# Patient Record
Sex: MALE | Race: WHITE | NOT HISPANIC OR LATINO | Employment: FULL TIME | URBAN - NONMETROPOLITAN AREA
[De-identification: names, ages, dates, MRNs, and addresses within clinical notes are randomized per-mention and may not be internally consistent; named-entity substitution may affect disease eponyms.]

---

## 2023-10-26 ENCOUNTER — APPOINTMENT (OUTPATIENT)
Dept: RADIOLOGY | Facility: HOSPITAL | Age: 58
End: 2023-10-26
Payer: COMMERCIAL

## 2023-10-26 ENCOUNTER — HOSPITAL ENCOUNTER (EMERGENCY)
Facility: HOSPITAL | Age: 58
Discharge: HOME | End: 2023-10-26
Attending: EMERGENCY MEDICINE
Payer: COMMERCIAL

## 2023-10-26 VITALS
SYSTOLIC BLOOD PRESSURE: 152 MMHG | OXYGEN SATURATION: 95 % | RESPIRATION RATE: 16 BRPM | TEMPERATURE: 97.8 F | HEIGHT: 68 IN | BODY MASS INDEX: 21.22 KG/M2 | HEART RATE: 68 BPM | WEIGHT: 140 LBS | DIASTOLIC BLOOD PRESSURE: 82 MMHG

## 2023-10-26 DIAGNOSIS — N28.89 RIGHT KIDNEY MASS: ICD-10-CM

## 2023-10-26 DIAGNOSIS — R10.11 RIGHT UPPER QUADRANT ABDOMINAL PAIN: Primary | ICD-10-CM

## 2023-10-26 DIAGNOSIS — S37.011A HEMATOMA OF RIGHT KIDNEY, INITIAL ENCOUNTER: ICD-10-CM

## 2023-10-26 LAB
ALBUMIN SERPL BCP-MCNC: 3.8 G/DL (ref 3.4–5)
ALP SERPL-CCNC: 59 U/L (ref 33–120)
ALT SERPL W P-5'-P-CCNC: 12 U/L (ref 10–52)
ANION GAP SERPL CALC-SCNC: 9 MMOL/L (ref 10–20)
APPEARANCE UR: CLEAR
AST SERPL W P-5'-P-CCNC: 12 U/L (ref 9–39)
BASOPHILS # BLD AUTO: 0.02 X10*3/UL (ref 0–0.1)
BASOPHILS NFR BLD AUTO: 0.2 %
BILIRUB SERPL-MCNC: 0.4 MG/DL (ref 0–1.2)
BILIRUB UR STRIP.AUTO-MCNC: NEGATIVE MG/DL
BUN SERPL-MCNC: 18 MG/DL (ref 6–23)
CALCIUM SERPL-MCNC: 8.7 MG/DL (ref 8.6–10.3)
CHLORIDE SERPL-SCNC: 106 MMOL/L (ref 98–107)
CO2 SERPL-SCNC: 29 MMOL/L (ref 21–32)
COLOR UR: YELLOW
CREAT SERPL-MCNC: 0.84 MG/DL (ref 0.5–1.3)
EOSINOPHIL # BLD AUTO: 0 X10*3/UL (ref 0–0.7)
EOSINOPHIL NFR BLD AUTO: 0 %
ERYTHROCYTE [DISTWIDTH] IN BLOOD BY AUTOMATED COUNT: 12.5 % (ref 11.5–14.5)
GFR SERPL CREATININE-BSD FRML MDRD: >90 ML/MIN/1.73M*2
GLUCOSE SERPL-MCNC: 126 MG/DL (ref 74–99)
GLUCOSE UR STRIP.AUTO-MCNC: NEGATIVE MG/DL
HCT VFR BLD AUTO: 36.1 % (ref 41–52)
HGB BLD-MCNC: 12 G/DL (ref 13.5–17.5)
HOLD SPECIMEN: NORMAL
IMM GRANULOCYTES # BLD AUTO: 0.04 X10*3/UL (ref 0–0.7)
IMM GRANULOCYTES NFR BLD AUTO: 0.3 % (ref 0–0.9)
KETONES UR STRIP.AUTO-MCNC: ABNORMAL MG/DL
LEUKOCYTE ESTERASE UR QL STRIP.AUTO: NEGATIVE
LIPASE SERPL-CCNC: 15 U/L (ref 9–82)
LYMPHOCYTES # BLD AUTO: 0.75 X10*3/UL (ref 1.2–4.8)
LYMPHOCYTES NFR BLD AUTO: 6.5 %
MCH RBC QN AUTO: 32.4 PG (ref 26–34)
MCHC RBC AUTO-ENTMCNC: 33.2 G/DL (ref 32–36)
MCV RBC AUTO: 98 FL (ref 80–100)
MONOCYTES # BLD AUTO: 0.63 X10*3/UL (ref 0.1–1)
MONOCYTES NFR BLD AUTO: 5.5 %
MUCOUS THREADS #/AREA URNS AUTO: ABNORMAL /LPF
NEUTROPHILS # BLD AUTO: 10.09 X10*3/UL (ref 1.2–7.7)
NEUTROPHILS NFR BLD AUTO: 87.5 %
NITRITE UR QL STRIP.AUTO: NEGATIVE
NRBC BLD-RTO: 0 /100 WBCS (ref 0–0)
PH UR STRIP.AUTO: 6 [PH]
PLATELET # BLD AUTO: 283 X10*3/UL (ref 150–450)
PMV BLD AUTO: 8.9 FL (ref 7.5–11.5)
POTASSIUM SERPL-SCNC: 3.9 MMOL/L (ref 3.5–5.3)
PROT SERPL-MCNC: 6.2 G/DL (ref 6.4–8.2)
PROT UR STRIP.AUTO-MCNC: ABNORMAL MG/DL
RBC # BLD AUTO: 3.7 X10*6/UL (ref 4.5–5.9)
RBC # UR STRIP.AUTO: NEGATIVE /UL
RBC #/AREA URNS AUTO: ABNORMAL /HPF
SODIUM SERPL-SCNC: 140 MMOL/L (ref 136–145)
SP GR UR STRIP.AUTO: >1.06
SQUAMOUS #/AREA URNS AUTO: ABNORMAL /HPF
UROBILINOGEN UR STRIP.AUTO-MCNC: <2 MG/DL
WBC # BLD AUTO: 11.5 X10*3/UL (ref 4.4–11.3)
WBC #/AREA URNS AUTO: ABNORMAL /HPF

## 2023-10-26 PROCEDURE — 81001 URINALYSIS AUTO W/SCOPE: CPT | Performed by: NURSE PRACTITIONER

## 2023-10-26 PROCEDURE — 76705 ECHO EXAM OF ABDOMEN: CPT | Performed by: RADIOLOGY

## 2023-10-26 PROCEDURE — 2500000004 HC RX 250 GENERAL PHARMACY W/ HCPCS (ALT 636 FOR OP/ED): Performed by: NURSE PRACTITIONER

## 2023-10-26 PROCEDURE — 2550000001 HC RX 255 CONTRASTS: Performed by: NURSE PRACTITIONER

## 2023-10-26 PROCEDURE — 99285 EMERGENCY DEPT VISIT HI MDM: CPT | Performed by: EMERGENCY MEDICINE

## 2023-10-26 PROCEDURE — 76705 ECHO EXAM OF ABDOMEN: CPT

## 2023-10-26 PROCEDURE — 74177 CT ABD & PELVIS W/CONTRAST: CPT | Performed by: RADIOLOGY

## 2023-10-26 PROCEDURE — 96374 THER/PROPH/DIAG INJ IV PUSH: CPT | Mod: 59

## 2023-10-26 PROCEDURE — 85025 COMPLETE CBC W/AUTO DIFF WBC: CPT | Performed by: NURSE PRACTITIONER

## 2023-10-26 PROCEDURE — 83690 ASSAY OF LIPASE: CPT | Performed by: NURSE PRACTITIONER

## 2023-10-26 PROCEDURE — 82435 ASSAY OF BLOOD CHLORIDE: CPT | Performed by: NURSE PRACTITIONER

## 2023-10-26 PROCEDURE — 96375 TX/PRO/DX INJ NEW DRUG ADDON: CPT

## 2023-10-26 PROCEDURE — 74177 CT ABD & PELVIS W/CONTRAST: CPT

## 2023-10-26 PROCEDURE — 96372 THER/PROPH/DIAG INJ SC/IM: CPT

## 2023-10-26 PROCEDURE — 36415 COLL VENOUS BLD VENIPUNCTURE: CPT | Performed by: NURSE PRACTITIONER

## 2023-10-26 RX ORDER — MORPHINE SULFATE 4 MG/ML
4 INJECTION, SOLUTION INTRAMUSCULAR; INTRAVENOUS ONCE
Status: COMPLETED | OUTPATIENT
Start: 2023-10-26 | End: 2023-10-26

## 2023-10-26 RX ORDER — OXYCODONE AND ACETAMINOPHEN 5; 325 MG/1; MG/1
1 TABLET ORAL EVERY 6 HOURS PRN
Qty: 5 TABLET | Refills: 0 | Status: SHIPPED | OUTPATIENT
Start: 2023-10-26 | End: 2023-10-30

## 2023-10-26 RX ORDER — DICYCLOMINE HYDROCHLORIDE 10 MG/ML
20 INJECTION INTRAMUSCULAR ONCE
Status: COMPLETED | OUTPATIENT
Start: 2023-10-26 | End: 2023-10-26

## 2023-10-26 RX ORDER — NAPROXEN 500 MG/1
500 TABLET ORAL 2 TIMES DAILY PRN
Qty: 30 TABLET | Refills: 0 | Status: SHIPPED | OUTPATIENT
Start: 2023-10-26 | End: 2023-11-14 | Stop reason: HOSPADM

## 2023-10-26 RX ORDER — KETOROLAC TROMETHAMINE 30 MG/ML
15 INJECTION, SOLUTION INTRAMUSCULAR; INTRAVENOUS ONCE
Status: COMPLETED | OUTPATIENT
Start: 2023-10-26 | End: 2023-10-26

## 2023-10-26 RX ADMIN — MORPHINE SULFATE 4 MG: 4 INJECTION, SOLUTION INTRAMUSCULAR; INTRAVENOUS at 16:18

## 2023-10-26 RX ADMIN — DICYCLOMINE HYDROCHLORIDE 20 MG: 20 INJECTION, SOLUTION INTRAMUSCULAR at 12:44

## 2023-10-26 RX ADMIN — KETOROLAC TROMETHAMINE 15 MG: 30 INJECTION, SOLUTION INTRAMUSCULAR; INTRAVENOUS at 12:43

## 2023-10-26 RX ADMIN — IOHEXOL 90 ML: 350 INJECTION, SOLUTION INTRAVENOUS at 15:14

## 2023-10-26 ASSESSMENT — PAIN - FUNCTIONAL ASSESSMENT
PAIN_FUNCTIONAL_ASSESSMENT: 0-10
PAIN_FUNCTIONAL_ASSESSMENT: 0-10

## 2023-10-26 ASSESSMENT — PAIN DESCRIPTION - ORIENTATION: ORIENTATION: RIGHT

## 2023-10-26 ASSESSMENT — COLUMBIA-SUICIDE SEVERITY RATING SCALE - C-SSRS
1. IN THE PAST MONTH, HAVE YOU WISHED YOU WERE DEAD OR WISHED YOU COULD GO TO SLEEP AND NOT WAKE UP?: NO
2. HAVE YOU ACTUALLY HAD ANY THOUGHTS OF KILLING YOURSELF?: NO
2. HAVE YOU ACTUALLY HAD ANY THOUGHTS OF KILLING YOURSELF?: NO
1. IN THE PAST MONTH, HAVE YOU WISHED YOU WERE DEAD OR WISHED YOU COULD GO TO SLEEP AND NOT WAKE UP?: NO
6. HAVE YOU EVER DONE ANYTHING, STARTED TO DO ANYTHING, OR PREPARED TO DO ANYTHING TO END YOUR LIFE?: NO

## 2023-10-26 ASSESSMENT — PAIN DESCRIPTION - LOCATION: LOCATION: ABDOMEN

## 2023-10-26 ASSESSMENT — PAIN SCALES - GENERAL
PAINLEVEL_OUTOF10: 8
PAINLEVEL_OUTOF10: 8
PAINLEVEL_OUTOF10: 5 - MODERATE PAIN
PAINLEVEL_OUTOF10: 1
PAINLEVEL_OUTOF10: 8
PAINLEVEL_OUTOF10: 2
PAINLEVEL_OUTOF10: 1

## 2023-10-26 ASSESSMENT — PAIN DESCRIPTION - PAIN TYPE: TYPE: ACUTE PAIN

## 2023-10-26 ASSESSMENT — PAIN DESCRIPTION - FREQUENCY: FREQUENCY: CONSTANT/CONTINUOUS

## 2023-10-26 ASSESSMENT — PAIN DESCRIPTION - DESCRIPTORS: DESCRIPTORS: ACHING

## 2023-10-26 ASSESSMENT — PAIN DESCRIPTION - ONSET: ONSET: SUDDEN

## 2023-10-26 NOTE — DISCHARGE INSTRUCTIONS
Urology will reach out to you for evaluation and further work up of right kidney mass and hematoma

## 2023-10-26 NOTE — ED PROVIDER NOTES
"Emergency Department Encounter  [facility] EMERGENCY MEDICINE    Patient: Kostas Ruff  MRN: [id number]  : 1965  Date of Evaluation: 10/26/2023  ED Provider: JAMEE Velasco-ANGELICA      Chief Complaint       Chief Complaint   Patient presents with    Abdominal Pain     Pt states right side abdomen pain .  Acute onset at 0800 today.  Denies N/V/D.  Pain 8/10.  No problem with voiding or BM.      [place]    (Location/Symptom, Timing/Onset, Context/Setting, Quality, Duration, Modifying Factors, Severity) Note limiting factors.   Limitations to History: none  Historian: self  Records reviewed: EMR inpatient and outpatient notes, Care Everywhere      Kostas Ruff is a 58 y.o. male who presents to the emergency department complaining of right upper quadrant pain that radiates to the right side of the back since 830 this morning, described as a \"pulsing\".  Patient states that this has been constant.  States that he does have a history of kidney stones but this feels different.  Denies any history of abdominal surgeries.  Denies any nausea, vomiting, diarrhea, lightheadedness, dizziness, syncope.  Denies any chest pain, shortness of breath.  Denies any fevers, chills, hematuria, urinary urgency, frequency, burning.  Denies any rashes or lesions.  Pain is unchanged with movement.    ROS:     Review of Systems  14 systems reviewed and otherwise acutely negative except as in the [place].          Past History     Past Medical History:   Diagnosis Date    Acute upper respiratory infection, unspecified 2020    Acute URI    Encounter for screening for malignant neoplasm of colon 2020    Screening for colorectal cancer    Personal history of other infectious and parasitic diseases 2021    History of herpes zoster     Past Surgical History:   Procedure Laterality Date    OTHER SURGICAL HISTORY  2020    Cyst excision    OTHER SURGICAL HISTORY  2020    Back surgery    OTHER " SURGICAL HISTORY  03/09/2020    Knee surgery     Social History     Socioeconomic History    Marital status:      Spouse name: None    Number of children: None    Years of education: None    Highest education level: None   Occupational History    None   Tobacco Use    Smoking status: Unknown    Smokeless tobacco: None   Substance and Sexual Activity    Alcohol use: Yes     Alcohol/week: 35.0 standard drinks of alcohol     Types: 35 Cans of beer per week    Drug use: Never    Sexual activity: None   Other Topics Concern    None   Social History Narrative    None     Social Determinants of Health     Financial Resource Strain: Not on file   Food Insecurity: Not on file   Transportation Needs: Not on file   Physical Activity: Not on file   Stress: Not on file   Social Connections: Not on file   Intimate Partner Violence: Not on file   Housing Stability: Not on file       Medications/Allergies     Previous Medications    No medications on file     No Known Allergies     Physical Exam       ED Triage Vitals [10/26/23 1215]   Temp Heart Rate Resp BP   36.6 °C (97.8 °F) 70 16 (!) 158/94      SpO2 Temp Source Heart Rate Source Patient Position   97 % Temporal Monitor Lying      BP Location FiO2 (%)     Right arm --         Physical Exam    GENERAL:  The patient appears nourished and normally developed. Vital signs as documented.     HEENT:  Head normocephalic, atraumatic, EOMs intact, PERRLA, Mucous membranes moist. Nares patent without copious rhinorrhea.  No lymphadenopathy.    PULMONARY:  Lungs are clear to auscultation, without any respiratory distress. Able to speak full sentences, no accessory muscle use    CARDIAC:   Normal rate. No murmurs, rubs or gallops    ABDOMEN:  Soft, mild tenderness on palpation to right upper quadrant, non tender, BS positive x 4 quadrants, No rebound or guarding, no peritoneal signs, no CVA tenderness, no masses or organomegaly    MUSCULOSKELETAL:   Able to ambulate, Non edematous,  with no obvious deformities. Pulses intact distal    SKIN:   Good color, with no significant rashes.  No pallor.    NEURO:  No obvious neurological deficits, normal sensation and strength bilaterally.  Able to follow commands, NIH 0, CN 2-12 intact.        Diagnostics   Labs:  Labs Reviewed   CBC WITH AUTO DIFFERENTIAL - Abnormal       Result Value    WBC 11.5 (*)     nRBC 0.0      RBC 3.70 (*)     Hemoglobin 12.0 (*)     Hematocrit 36.1 (*)     MCV 98      MCH 32.4      MCHC 33.2      RDW 12.5      Platelets 283      MPV 8.9      Neutrophils % 87.5      Immature Granulocytes %, Automated 0.3      Lymphocytes % 6.5      Monocytes % 5.5      Eosinophils % 0.0      Basophils % 0.2      Neutrophils Absolute 10.09 (*)     Immature Granulocytes Absolute, Automated 0.04      Lymphocytes Absolute 0.75 (*)     Monocytes Absolute 0.63      Eosinophils Absolute 0.00      Basophils Absolute 0.02     COMPREHENSIVE METABOLIC PANEL - Abnormal    Glucose 126 (*)     Sodium 140      Potassium 3.9      Chloride 106      Bicarbonate 29      Anion Gap 9 (*)     Urea Nitrogen 18      Creatinine 0.84      eGFR >90      Calcium 8.7      Albumin 3.8      Alkaline Phosphatase 59      Total Protein 6.2 (*)     AST 12      Bilirubin, Total 0.4      ALT 12     URINALYSIS WITH REFLEX MICROSCOPIC AND CULTURE - Abnormal    Color, Urine Yellow      Appearance, Urine Clear      Specific Gravity, Urine >1.060 (*)     pH, Urine 6.0      Protein, Urine 30 (1+) (*)     Glucose, Urine NEGATIVE      Blood, Urine NEGATIVE      Ketones, Urine 5 (TRACE) (*)     Bilirubin, Urine NEGATIVE      Urobilinogen, Urine <2.0      Nitrite, Urine NEGATIVE      Leukocyte Esterase, Urine NEGATIVE     URINALYSIS MICROSCOPIC WITH REFLEX CULTURE - Abnormal    WBC, Urine 1-5      RBC, Urine 6-10 (*)     Squamous Epithelial Cells, Urine 1-9 (SPARSE)      Mucus, Urine 1+     LIPASE - Normal    Lipase 15      Narrative:     Venipuncture immediately after or during the  administration of Metamizole may lead to falsely low results. Testing should be performed immediately prior to Metamizole dosing.   URINALYSIS WITH REFLEX MICROSCOPIC AND CULTURE    Narrative:     The following orders were created for panel order Urinalysis with Reflex Microscopic and Culture.  Procedure                               Abnormality         Status                     ---------                               -----------         ------                     Urinalysis with Reflex M...[[id number]]  Abnormal            Final result               Extra Urine Gray Tube[[id number]]                            In process                   Please view results for these tests on the individual orders.   EXTRA URINE GRAY TUBE     Radiographs:  CT abdomen pelvis w IV contrast   Final Result   1. Mass from the right kidney, highly concerning in appearance for   malignancy.   2. Findings concerning for subcapsular hematoma involving the right   kidney.        MACRO:   Rico Linares discussed the significance and urgency of this critical   finding by telephone with  KADIE HERNANDEZ on 10/26/2023 at 3:43 pm.   (**-RCF-**) Findings:  See findings.             Signed by: Rico Linares 10/26/2023 3:44 PM   Dictation workstation:   [id number]      US gallbladder   Final Result   Large solid right renal mass consistent with renal cell carcinoma   until proved otherwise. Further evaluation recommended.        Multiple small probable polyps in the gallbladder.        MACRO:   None.        Signed by: Betsey Choi 10/26/2023 1:49 PM   Dictation workstation:   [id number]              Assessment   In brief, Kostas Ruff is a 58 y.o. male who presented to the emergency department right upper quadrant pain that radiates to the back    Plan   IV, lab work, urinalysis, gallbladder    Differentials   Cholecystitis  Choledocholithiasis  Pyelonephritis  Nephrolithiasis  Appendicitis  Musculoskeletal pain    ED Course     Diagnoses as of  "10/26/23 1656   Right upper quadrant abdominal pain   Right kidney mass   Hematoma of right kidney, initial encounter       Visit Vitals  /80 (BP Location: Right arm, Patient Position: Lying)   Pulse 70   Temp 36.6 °C (97.8 °F) (Temporal)   Resp 16   Ht 1.727 m (5' 8\")   Wt 63.5 kg (140 lb)   SpO2 94%   BMI 21.29 kg/m²   Smoking Status Unknown   BSA 1.75 m²       Medications   ketorolac (Toradol) injection 15 mg (15 mg intravenous Given 10/26/23 1243)   dicyclomine (Bentyl) injection 20 mg (20 mg intramuscular Given 10/26/23 1244)   iohexol (OMNIPaque) 350 mg iodine/mL solution 90 mL (90 mL intravenous Given 10/26/23 1514)   morphine injection 4 mg (4 mg intravenous Given 10/26/23 1618)       Plan of care discussed, patient is stable appearing, reports complete resolution of pain after Toradol administration, sent for ultrasound, results reviewed and discussed, sent for CT scan to better evaluate right renal mass.  Spoke with radiologist, reported to have 7 cm right renal mass with 2.2 cm subcapsular hematoma, spoke with general surgery here, no surgical intervention necessary at this time, patient is hemodynamically stable.  Consulted oncology at [facility] who advised to consult with urology, spoke with Dr. Kerns who will have urology department reach out as an outpatient to this patient for outpatient follow-up of right renal mass.  Will prescribe Norco for pain, educated on worsening signs and symptoms      Final Impression      1. Right upper quadrant abdominal pain    2. Right kidney mass    3. Hematoma of right kidney, initial encounter          DISPOSITION  Disposition: Discharge  Patient condition is: Stable    Comment: Please note this report has been produced using speech recognition software and may contain errors related to that system including errors in grammar, punctuation, and spelling, as well as words and phrases that may be inappropriate.  If there are any questions or concerns please feel " free to contact the dictating provider for clarification.    JAMEE Velasco-ZAMZAM Chapin  10/26/23 3867

## 2023-10-30 ENCOUNTER — OFFICE VISIT (OUTPATIENT)
Dept: PRIMARY CARE | Facility: CLINIC | Age: 58
End: 2023-10-30
Payer: COMMERCIAL

## 2023-10-30 VITALS
WEIGHT: 144.8 LBS | SYSTOLIC BLOOD PRESSURE: 144 MMHG | HEIGHT: 68 IN | DIASTOLIC BLOOD PRESSURE: 78 MMHG | OXYGEN SATURATION: 95 % | HEART RATE: 72 BPM | BODY MASS INDEX: 21.95 KG/M2

## 2023-10-30 DIAGNOSIS — N28.89 RENAL MASS: ICD-10-CM

## 2023-10-30 DIAGNOSIS — F41.1 GENERALIZED ANXIETY DISORDER: ICD-10-CM

## 2023-10-30 DIAGNOSIS — I48.20 CHRONIC ATRIAL FIBRILLATION (MULTI): ICD-10-CM

## 2023-10-30 DIAGNOSIS — Z12.5 SCREENING PSA (PROSTATE SPECIFIC ANTIGEN): Primary | ICD-10-CM

## 2023-10-30 PROBLEM — F41.9 ANXIETY DISORDER: Status: ACTIVE | Noted: 2023-10-30

## 2023-10-30 PROBLEM — I48.91 AFIB (MULTI): Status: ACTIVE | Noted: 2023-10-30

## 2023-10-30 PROCEDURE — 99214 OFFICE O/P EST MOD 30 MIN: CPT | Performed by: FAMILY MEDICINE

## 2023-10-30 RX ORDER — SERTRALINE HYDROCHLORIDE 100 MG/1
100 TABLET, FILM COATED ORAL DAILY
Qty: 90 TABLET | Refills: 3 | Status: SHIPPED | OUTPATIENT
Start: 2023-10-30 | End: 2024-10-24

## 2023-10-30 RX ORDER — DIGOXIN 250 MCG
250 TABLET ORAL DAILY
Qty: 90 TABLET | Refills: 3 | Status: SHIPPED | OUTPATIENT
Start: 2023-10-30 | End: 2024-10-29

## 2023-10-30 NOTE — PROGRESS NOTES
Subjective   Kostas Ruff is a 58 y.o. male who presents for No chief complaint on file..  Here for follow up recent ER visit for RUQ abdominal pain.  While there he had a CT of the abdomen which showed a mass in the right kidney concerning for malignancy as well as a subcapsular hematoma.  He states that the pain is better than it was - still has some discomfort.  He will be seeing urology soon.              Objective   Visit Vitals  /78 (BP Location: Left arm, Patient Position: Sitting, BP Cuff Size: Adult)   Pulse 72      Physical Exam  Vitals reviewed.   Constitutional:       General: He is not in acute distress.  Cardiovascular:      Rate and Rhythm: Normal rate and regular rhythm.      Heart sounds: No murmur heard.  Pulmonary:      Effort: Pulmonary effort is normal. No respiratory distress.      Breath sounds: Normal breath sounds.   Skin:     General: Skin is warm and dry.   Neurological:      General: No focal deficit present.      Mental Status: He is alert. Mental status is at baseline.        CT abdomen pelvis w IV contrast  Status: Final result     PACS Images     Show images for CT abdomen pelvis w IV contrast  Signed by    Signed Time Phone Pager   Rico Linares MD 10/26/2023 15:44 [phone]     Exam Information    Status Exam Begun Exam Ended   Final 10/26/2023 14:57 10/26/2023 15:12     Study Result    Narrative & Impression   Interpreted By:  Rico Linares,   STUDY:  CT ABDOMEN PELVIS W IV CONTRAST; 10/26/2023 3:12 pm      INDICATION:  Signs/Symptoms:right sided pain.      COMPARISON:  CT dated 10/29/2011      ACCESSION NUMBER(S):  [id number]      ORDERING CLINICIAN:  KADIE HERNANDEZ      TECHNIQUE:  Contiguous axial images were obtained at 3mm slice thickness through  the abdomen and pelvis following intravenous contrast administration.  Coronal and sagittal reconstructions at 3 mm slice thickness were  performed. 90 cc of Omnipaque 350 were administered  intravenously  without immediate complication.      FINDINGS:  LOWER CHEST:  Evaluation of the visualized lung bases demonstrate mild scarring  and/or atelectasis at the right lung base. The heart is within normal  limits for size.      ABDOMEN:      LIVER:  The liver is within normal limits for appearance, without evidence of  focal masses.      BILE DUCTS:  No definite intra or extrahepatic biliary dilatation is identified.      GALLBLADDER:  The gallbladder is nondilated. No definite calcified gallstones are  seen.      PANCREAS:  The pancreas is within normal limits for appearance, without evidence  of focal masses.      SPLEEN:  The spleen is within normal limits for size. No focal splenic mass is  seen.      ADRENAL GLANDS:  Diffuse thickening is seen throughout the adrenals bilaterally,  similar to prior studies. No focal adrenal mass is seen.      KIDNEYS AND URETERS:  There is a mass identified from the lower pole of the right kidney,  measuring at up to 6.9 x 6.3 x 6.2 cm in diameter. A heterogeneous  hyperdense fluid collection is seen along the posterolateral aspect  of the right kidney, measuring at up to 2.2 cm in thickness, most  consistent with a subcapsular hematoma. There is decreased  enhancement of the right kidney relative to the left kidney. There is  no hydronephrosis, hydroureter or ureteral calculus identified  bilaterally. Nonobstructive calculi are seen in the left kidney,  measuring up to 4 mm in diameter. No definite additional enhancing  renal masses are seen.      PELVIS:      BLADDER:  The urinary bladder is grossly unremarkable for CT appearance.      REPRODUCTIVE ORGANS:  The prostate is within normal limits for appearance.      BOWEL:  The colon and small bowel are within normal limits for course,  caliber and appearance, without evidence of wall thickening or  obstruction. The appendix is decompressed. No CT evidence of acute  diverticulitis or appendicitis is seen.       VESSELS:  Scattered atherosclerotic calcifications are seen throughout the  infrarenal abdominal aorta and iliac arteries. The abdominal aorta is  within normal limits for course, caliber and appearance, without  evidence of aneurysm.      PERITONEUM/RETROPERITONEUM/LYMPH NODES:  There is no free intraperitoneal air or free fluid identified. No  gross mesenteric or retroperitoneal lymphadenopathy is identified.      BONE AND SOFT TISSUE:  There is no evidence of acute fracture identified. No evidence of  abdominal wall mass or hernia is identified.      IMPRESSION:  1. Mass from the right kidney, highly concerning in appearance for  malignancy.  2. Findings concerning for subcapsular hematoma involving the right  kidney.      MACRO:  Rico Linares discussed the significance and urgency of this critical  finding by telephone with  KADIE HERNANDEZ on 10/26/2023 at 3:43 pm.  (**-RCF-**) Findings:  See findings.          Signed by: Rico Linares 10/26/2023 3:44 PM  Dictation workstation:   [id number]     Scans on Order [id number]        Diagnostics Testing - Other - Scan on 10/26/2023  3:12 PM: consent              Result History    CT abdomen pelvis w IV contrast (Order #[id number]) on 10/26/2023 - Order Result History Report    Breast Imaging Recommendations  Kostas Ruff  No recommendations exist for this order.  Imaging Findings    Finding Acuity Linked Recommendation Recommendation Status Finding Status   See Findings Red Alert   Reviewed     Signed by    Signed Time Phone Pager   Rico Linares MD 10/26/2023 15:44 [phone]     Exam Information    Status Exam Begun Exam Ended   Final 10/26/2023 14:57 10/26/2023 15:12     External Results Report    Open External Results Report    Encounter    View Encounter             Screening Form Questions    No questions have been answered for this form.     CT abdomen pelvis w IV contrast  Order: [id number]  Status: Final result         Visible to patient: No  (inaccessible in [facility])         Next appt: 11/02/2023 at 02:50 PM in Urology (Rico Arriaga MD)      0 Result Notes    Findings     Acuity Comment   See Findings Red Alert      Details    Reading Physician Reading Date Result Priority   Rico Linares MD  [phone]  [id number] 10/26/2023      Narrative & Impression  Interpreted By:  Rico Linares,   STUDY:  CT ABDOMEN PELVIS W IV CONTRAST; 10/26/2023 3:12 pm      INDICATION:  Signs/Symptoms:right sided pain.      COMPARISON:  CT dated 10/29/2011      ACCESSION NUMBER(S):  [id number]      ORDERING CLINICIAN:  KADIE HERNANDEZ      TECHNIQUE:  Contiguous axial images were obtained at 3mm slice thickness through  the abdomen and pelvis following intravenous contrast administration.  Coronal and sagittal reconstructions at 3 mm slice thickness were  performed. 90 cc of Omnipaque 350 were administered intravenously  without immediate complication.      FINDINGS:  LOWER CHEST:  Evaluation of the visualized lung bases demonstrate mild scarring  and/or atelectasis at the right lung base. The heart is within normal  limits for size.      ABDOMEN:      LIVER:  The liver is within normal limits for appearance, without evidence of  focal masses.      BILE DUCTS:  No definite intra or extrahepatic biliary dilatation is identified.      GALLBLADDER:  The gallbladder is nondilated. No definite calcified gallstones are  seen.      PANCREAS:  The pancreas is within normal limits for appearance, without evidence  of focal masses.      SPLEEN:  The spleen is within normal limits for size. No focal splenic mass is  seen.      ADRENAL GLANDS:  Diffuse thickening is seen throughout the adrenals bilaterally,  similar to prior studies. No focal adrenal mass is seen.      KIDNEYS AND URETERS:  There is a mass identified from the lower pole of the right kidney,  measuring at up to 6.9 x 6.3 x 6.2 cm in diameter. A heterogeneous  hyperdense fluid collection is seen along the  posterolateral aspect  of the right kidney, measuring at up to 2.2 cm in thickness, most  consistent with a subcapsular hematoma. There is decreased  enhancement of the right kidney relative to the left kidney. There is  no hydronephrosis, hydroureter or ureteral calculus identified  bilaterally. Nonobstructive calculi are seen in the left kidney,  measuring up to 4 mm in diameter. No definite additional enhancing  renal masses are seen.      PELVIS:      BLADDER:  The urinary bladder is grossly unremarkable for CT appearance.      REPRODUCTIVE ORGANS:  The prostate is within normal limits for appearance.      BOWEL:  The colon and small bowel are within normal limits for course,  caliber and appearance, without evidence of wall thickening or  obstruction. The appendix is decompressed. No CT evidence of acute  diverticulitis or appendicitis is seen.      VESSELS:  Scattered atherosclerotic calcifications are seen throughout the  infrarenal abdominal aorta and iliac arteries. The abdominal aorta is  within normal limits for course, caliber and appearance, without  evidence of aneurysm.      PERITONEUM/RETROPERITONEUM/LYMPH NODES:  There is no free intraperitoneal air or free fluid identified. No  gross mesenteric or retroperitoneal lymphadenopathy is identified.      BONE AND SOFT TISSUE:  There is no evidence of acute fracture identified. No evidence of  abdominal wall mass or hernia is identified.      IMPRESSION:  1. Mass from the right kidney, highly concerning in appearance for  malignancy.  2. Findings concerning for subcapsular hematoma involving the right  kidney.      MACRO:  Rico Linares discussed the significance and urgency of this critical  finding by telephone with  KADIE HERNANDEZ on 10/26/2023 at 3:43 pm.  (**-RCF-**) Findings:  See findings.          Signed by: Rico Linares 10/26/2023 3:44 PM  Dictation workstation:   [id number]             Specimen Collected: 10/26/23 15:45 Last Resulted: 10/26/23  15:44       Order Details          View Encounter          Lab and Collection Details          Routing          Result History       View All Conversations on this Encounter           Scans on Order [id number]        Diagnostics Testing - Other - Scan on 10/26/2023  3:12 PM: consent             Result Care Coordination      Patient Communication     Add Comments   Not seen Back to Top           Other Results from 10/26/2023       Contains abnormal data Urinalysis with Reflex Microscopic and Culture  Order: [id number] - Part of Panel Order [id number]  Status: Final result         Visible to patient: No (inaccessible in [facility])         Next appt: 11/02/2023 at 02:50 PM in Urology (Rico Arriaga MD)      0 Result Notes              Component  Ref Range & Units 10/26/23 1532 Comments    Color, Urine  Straw, Yellow Yellow     Appearance, Urine  Clear Clear     Specific Gravity, Urine  1.005 - 1.035 >1.060 Abnormal  Specific gravity of >1.060 may be falsely elevated due to interferences with measurement. If clinically indicated, repeat testing with an alternative method is available by contacting the laboratory within 24 hours.    pH, Urine  5.0, 5.5, 6.0, 6.5, 7.0, 7.5, 8.0 6.0     Protein, Urine  NEGATIVE mg/dL 30 (1+) Normal (N)     Glucose, Urine  NEGATIVE mg/dL NEGATIVE     Blood, Urine  NEGATIVE NEGATIVE     Ketones, Urine  NEGATIVE mg/dL 5 (TRACE) Abnormal      Bilirubin, Urine  NEGATIVE NEGATIVE     Urobilinogen, Urine  <2.0 mg/dL <2.0     Nitrite, Urine  NEGATIVE NEGATIVE     Leukocyte Esterase, Urine  NEGATIVE NEGATIVE               Specimen Collected: 10/26/23 15:32 Last Resulted: 10/26/23 15:49        Lab Flowsheet          Order Details          View Encounter          Lab and Collection Details          Routing          Result History       View All Conversations on this Encounter             Result Care Coordination        Patient Communication     Add Comments   Not seen Back to Top              Extra  Urine Joaquin Tube  Order: [id number] - Part of Panel Order [id number]  Status: Final result         Visible to patient: No (inaccessible in [facility])         Next appt: 11/02/2023 at 02:50 PM in Urology (Rico Arriaga MD)      0 Result Notes           Component 10/26/23 1532 Comments   Extra Tube Hold for add-ons. Auto resulted.              Specimen Collected: 10/26/23 15:32 Last Resulted: 10/26/23 17:01        Lab Flowsheet          Order Details          View Encounter          Lab and Collection Details          Routing          Result History       View All Conversations on this Encounter             Result Care Coordination        Patient Communication     Add Comments   Not seen Back to Top               Contains abnormal data Urinalysis Microscopic  Order: [id number] - Reflex for Order [id number]  Status: Final result         Visible to patient: No (inaccessible in [facility])         Next appt: 11/02/2023 at 02:50 PM in Urology (Rico Arriaga MD)      0 Result Notes             Component  Ref Range & Units 10/26/23 1532    WBC, Urine  1-5, NONE /HPF 1-5    RBC, Urine  NONE, 1-2, 3-5 /HPF 6-10 Abnormal     Squamous Epithelial Cells, Urine  Reference range not established. /HPF 1-9 (SPARSE)    Mucus, Urine  Reference range not established. /LPF 1+              Specimen Collected: 10/26/23 15:32 Last Resulted: 10/26/23 15:49        Lab Flowsheet          Order Details          View Encounter          Lab and Collection Details          Routing          Result History       View All Conversations on this Encounter             Result Care Coordination        Patient Communication     Add Comments   Not seen Back to Top               Contains abnormal data CBC and Auto Differential  Order: [id number]  Status: Final result         Visible to patient: No (inaccessible in [facility])         Next appt: 11/02/2023 at 02:50 PM in Urology (Rico Arriaga MD)      0 Result Notes              Component  Ref Range & Units  10/26/23 1242 8/20/21 1117    WBC  4.4 - 11.3 x10*3/uL 11.5 High  5.1 R    nRBC  0.0 - 0.0 /100 WBCs 0.0 0.1 R    RBC  4.50 - 5.90 x10*6/uL 3.70 Low  4.40 Low  R    Hemoglobin  13.5 - 17.5 g/dL 12.0 Low  15.1    Hematocrit  41.0 - 52.0 % 36.1 Low  44.2    MCV  80 - 100 fL 98 100    MCH  26.0 - 34.0 pg 32.4     MCHC  32.0 - 36.0 g/dL 33.2 34.1    RDW  11.5 - 14.5 % 12.5 12.8    Platelets  150 - 450 x10*3/uL 283 263 R    MPV  7.5 - 11.5 fL 8.9     Neutrophils %  40.0 - 80.0 % 87.5 52.6    Immature Granulocytes %, Automated  0.0 - 0.9 % 0.3    Comment: Immature Granulocyte Count (IG) includes promyelocytes, myelocytes and metamyelocytes but does not include bands. Percent differential counts (%) should be interpreted in the context of the absolute cell counts (cells/UL).    Lymphocytes %  13.0 - 44.0 % 6.5 33.4    Monocytes %  2.0 - 10.0 % 5.5 10.7    Eosinophils %  0.0 - 6.0 % 0.0 2.8    Basophils %  0.0 - 2.0 % 0.2 0.5    Neutrophils Absolute  1.20 - 7.70 x10*3/uL 10.09 High  2.70 R, CM   Comment: Percent differential counts (%) should be interpreted in the context of the absolute cell counts (cells/uL).    Immature Granulocytes Absolute, Automated  0.00 - 0.70 x10*3/uL 0.04     Lymphocytes Absolute  1.20 - 4.80 x10*3/uL 0.75 Low  1.70 R    Monocytes Absolute  0.10 - 1.00 x10*3/uL 0.63 0.50 R    Eosinophils Absolute  0.00 - 0.70 x10*3/uL 0.00 0.10 R    Basophils Absolute  0.00 - 0.10 x10*3/uL 0.02 0.00 R              Specimen Collected: 10/26/23 12:42 Last Resulted: 10/26/23 12:53        Lab Flowsheet          Order Details          View Encounter          Lab and Collection Details          Routing          Result History       View All Conversations on this Encounter        CM=Additional comments  R=Reference range differs from displayed range          Result Care Coordination        Patient Communication     Add Comments   Not seen Back to Top               Contains abnormal data Comprehensive metabolic  panel  Order: [id number]  Status: Final result         Visible to patient: No (inaccessible in [facility])         Next appt: 11/02/2023 at 02:50 PM in Urology (Rico Arriaga MD)      0 Result Notes              Component  Ref Range & Units 10/26/23 1242 8/20/21 1117    Glucose  74 - 99 mg/dL 126 High  111 High     Sodium  136 - 145 mmol/L 140 141    Potassium  3.5 - 5.3 mmol/L 3.9 4.2    Chloride  98 - 107 mmol/L 106 106    Bicarbonate  21 - 32 mmol/L 29 28    Anion Gap  10 - 20 mmol/L 9 Low  11    Urea Nitrogen  6 - 23 mg/dL 18 14    Creatinine  0.50 - 1.30 mg/dL 0.84 0.64    eGFR  >60 mL/min/1.73m*2 >90    Comment: Calculations of estimated GFR are performed using the 2021 CKD-EPI Study Refit equation without the race variable for the IDMS-Traceable creatinine methods.  https://jasn.asnjournals.org/content/early/2021/09/22/ASN.3282192318    Calcium  8.6 - 10.3 mg/dL 8.7 8.8    Albumin  3.4 - 5.0 g/dL 3.8 3.9    Alkaline Phosphatase  33 - 120 U/L 59 66    Total Protein  6.4 - 8.2 g/dL 6.2 Low  6.5    AST  9 - 39 U/L 12 15    Bilirubin, Total  0.0 - 1.2 mg/dL 0.4 0.7    ALT  10 - 52 U/L 12 15 CM   Comment: Patients treated with Sulfasalazine may generate falsely decreased results for ALT.              Specimen Collected: 10/26/23 12:42 Last Resulted: 10/26/23 13:09        Lab Flowsheet          Order Details          View Encounter          Lab and Collection Details          Routing          Result History       View All Conversations on this Encounter        CM=Additional comments          Related Result Highlights     Lipase Final result 10/26/2023                         Result Care Coordination        Patient Communication     Add Comments   Not seen Back to Top                Lipase  Order: [id number]  Status: Final result         Visible to patient: No (inaccessible in [facility])         Next appt: 11/02/2023 at 02:50 PM in Urology (Rico Arriaga MD)      0 Result Notes             Component  Ref Range &  Units 10/26/23 1242    Lipase  9 - 82 U/L 15                Narrative    Venipuncture immediately after or during the administration of Metamizole may lead to falsely low results. Testing should be performed immediately prior to Metamizole dosing.      Specimen Collected: 10/26/23 12:42 Last Resulted: 10/26/23 13:09        Lab Flowsheet          Order Details          View Encounter          Lab and Collection Details          Routing          Result History       View All Conversations on this Encounter             Related Result Highlights     Comprehensive metabolic panel Final result 10/26/2023                         Result Care Coordination        Patient Communication     Add Comments   Not seen Back to Top               Study Details    Open Study Details           Interpretation Summary    Interpreted By:  Rico Linares,   STUDY:  CT ABDOMEN PELVIS W IV CONTRAST; 10/26/2023 3:12 pm      INDICATION:  Signs/Symptoms:right sided pain.      COMPARISON:  CT dated 10/29/2011      ACCESSION NUMBER(S):  [id number]      ORDERING CLINICIAN:  KADIE HERNANDEZ      TECHNIQUE:  Contiguous axial images were obtained at 3mm slice thickness through  the abdomen and pelvis following intravenous contrast administration.  Coronal and sagittal reconstructions at 3 mm slice thickness were  performed. 90 cc of Omnipaque 350 were administered intravenously  without immediate complication.      FINDINGS:  LOWER CHEST:  Evaluation of the visualized lung bases demonstrate mild scarring  and/or atelectasis at the right lung base. The heart is within normal  limits for size.      ABDOMEN:      LIVER:  The liver is within normal limits for appearance, without evidence of  focal masses.      BILE DUCTS:  No definite intra or extrahepatic biliary dilatation is identified.      GALLBLADDER:  The gallbladder is nondilated. No definite calcified gallstones are  seen.      PANCREAS:  The pancreas is within normal limits for appearance,  without evidence  of focal masses.      SPLEEN:  The spleen is within normal limits for size. No focal splenic mass is  seen.      ADRENAL GLANDS:  Diffuse thickening is seen throughout the adrenals bilaterally,  similar to prior studies. No focal adrenal mass is seen.      KIDNEYS AND URETERS:  There is a mass identified from the lower pole of the right kidney,  measuring at up to 6.9 x 6.3 x 6.2 cm in diameter. A heterogeneous  hyperdense fluid collection is seen along the posterolateral aspect  of the right kidney, measuring at up to 2.2 cm in thickness, most  consistent with a subcapsular hematoma. There is decreased  enhancement of the right kidney relative to the left kidney. There is  no hydronephrosis, hydroureter or ureteral calculus identified  bilaterally. Nonobstructive calculi are seen in the left kidney,  measuring up to 4 mm in diameter. No definite additional enhancing  renal masses are seen.      PELVIS:      BLADDER:  The urinary bladder is grossly unremarkable for CT appearance.      REPRODUCTIVE ORGANS:  The prostate is within normal limits for appearance.      BOWEL:  The colon and small bowel are within normal limits for course,  caliber and appearance, without evidence of wall thickening or  obstruction. The appendix is decompressed. No CT evidence of acute  diverticulitis or appendicitis is seen.      VESSELS:  Scattered atherosclerotic calcifications are seen throughout the  infrarenal abdominal aorta and iliac arteries. The abdominal aorta is  within normal limits for course, caliber and appearance, without  evidence of aneurysm.      PERITONEUM/RETROPERITONEUM/LYMPH NODES:  There is no free intraperitoneal air or free fluid identified. No  gross mesenteric or retroperitoneal lymphadenopathy is identified.      BONE AND SOFT TISSUE:  There is no evidence of acute fracture identified. No evidence of  abdominal wall mass or hernia is identified.      IMPRESSION:  1. Mass from the right  kidney, highly concerning in appearance for  malignancy.  2. Findings concerning for subcapsular hematoma involving the right  kidney.      MACRO:  Rico Linares discussed the significance and urgency of this critical  finding by telephone with  KADIE HERNANDEZ on 10/26/2023 at 3:43 pm.  (**-RCF-**) Findings:  See findings.          Signed by: Rico Linares 10/26/2023 3:44 PM  Dictation workstation:   [id number]          Assessment/Plan   Problem List Items Addressed This Visit       Afib (CMS/HCC)    Relevant Medications    digoxin (Lanoxin) 250 MCG tab;et    Other Relevant Orders    CBC and Auto Differential    Comprehensive Metabolic Panel    Lipid Panel    TSH with reflex to Free T4 if abnormal    Vitamin B12    Digoxin level    Anxiety disorder    Relevant Medications    sertraline (Zoloft) 100 mg tablet    Renal mass     Other Visit Diagnoses       Screening PSA (prostate specific antigen)    -  Primary    Relevant Orders    Prostate Specific Antigen               Bethany Vieira MD

## 2023-10-30 NOTE — PROGRESS NOTES
Subjective   Patient ID: Kostas Ruff is a 58 y.o. male who presents for follow up to ED on 10/26/2023 for upper right abdominal  quadrant pain.     HPI     Review of Systems    Objective   There were no vitals taken for this visit.    Physical Exam    Assessment/Plan

## 2023-10-30 NOTE — LETTER
October 30, 2023     Patient: Kostas Ruff   YOB: 1965   Date of Visit: 10/30/2023       To Whom It May Concern:    Kostas Ruff was seen in my clinic on 10/30/2023 at 11:40 am. Please excuse Kostas for his absence from work on this day to make the appointment.  May return 10/31 but No lifting greater than 20 pounds for 2 weeks.    If you have any questions or concerns, please don't hesitate to call.         Sincerely,         Bethany Vieira MD        CC: No Recipients   2

## 2023-11-01 NOTE — PROGRESS NOTES
"FUV    Last seen by OWEN Brown     HISTORY OF PRESENT ILLNESS:   Kostas Ruff is a 58 y.o. male who is being seen today for review of renal masses found on CT ab/pelv on 10/26/23.    PAST MEDICAL HISTORY:  Past Medical History:   Diagnosis Date    Acute upper respiratory infection, unspecified 03/17/2020    Acute URI    Encounter for screening for malignant neoplasm of colon 05/21/2020    Screening for colorectal cancer    Personal history of other infectious and parasitic diseases 08/06/2021    History of herpes zoster       PAST SURGICAL HISTORY:  Past Surgical History:   Procedure Laterality Date    OTHER SURGICAL HISTORY  03/09/2020    Cyst excision    OTHER SURGICAL HISTORY  03/09/2020    Back surgery    OTHER SURGICAL HISTORY  03/09/2020    Knee surgery        ALLERGIES:   No Known Allergies     MEDICATIONS:   Current Outpatient Medications   Medication Instructions    digoxin (LANOXIN) 250 mcg, oral, Daily    naproxen (NAPROSYN) 500 mg, oral, 2 times daily PRN    sertraline (ZOLOFT) 100 mg, oral, Daily        PHYSICAL EXAM:  There were no vitals taken for this visit.  Constitutional: Patient appears well-developed and well-nourished. No distress.    Pulmonary/Chest: Effort normal. No respiratory distress.   Abdominal: Soft, ND NT  : WNL  Musculoskeletal: Normal range of motion.    Neurological: Alert and oriented to person, place, and time.  Psychiatric: Normal mood and affect. Behavior is normal. Thought content normal.      Labs  No results found for: \"TESTOSTERONE\"  No results found for: \"PSA\"  No components found for: \"CBC\"  Lab Results   Component Value Date    CREATININE 0.84 10/26/2023     No components found for: \"TESTOTMS\"  No results found for: \"TESTF\"    Imaging:  - CT ab/pelvis on 10/26/23 demonstrated a mass from the right kidney, highly concerning in appearance for malignancy. Findings concerning for subcapsular hematoma involving the right kidney.  - Results reviewed with patient. " Patient reassured.     Discussion:  Reports he was having abdominal/back pain which prompted the CT scan and X-rays in the ER where masses were found. Discussed patient to have either a open R partial nephrectomy or R nephrectomy for removal of mass. Risks, benefits, and alternatives were explained. I would also recommend to allow the bleeding around the area to resolve prior to any procedure. Patient desires to proceed with right partial nephrectomy. Will tentatively plan right partial nephrectomy for 11/13/23. Does not follow with cardiology or pulmonology, nor does he take any anticoagulants. No hx of prior abdominal surgeries. Patient verbalizes understanding and has no other questions at this time.      Assessment:    No diagnosis found.    Kostas Ruff is a 58 y.o. male here for FUV     Plan:   1) Will tentatively plan for right partial nephrectomy for 11/13/23.  2) All questions and concerns were addressed. Patient verbalizes understanding and has no other questions at this time.     Scribe Attestation:  By signing my name below, ISaranya Scribe   attest that this documentation has been prepared under the direction and in the presence of Rico Arriaga MD.

## 2023-11-02 ENCOUNTER — TELEMEDICINE (OUTPATIENT)
Dept: UROLOGY | Facility: HOSPITAL | Age: 58
End: 2023-11-02
Payer: COMMERCIAL

## 2023-11-02 DIAGNOSIS — N28.89 RENAL MASS: Primary | ICD-10-CM

## 2023-11-02 PROCEDURE — 99204 OFFICE O/P NEW MOD 45 MIN: CPT | Performed by: UROLOGY

## 2023-11-02 PROCEDURE — 99214 OFFICE O/P EST MOD 30 MIN: CPT | Mod: 95 | Performed by: UROLOGY

## 2023-11-02 RX ORDER — CELECOXIB 50 MG/1
400 CAPSULE ORAL ONCE
Status: CANCELLED | OUTPATIENT
Start: 2023-11-02 | End: 2023-11-02

## 2023-11-02 RX ORDER — HEPARIN SODIUM 5000 [USP'U]/ML
5000 INJECTION, SOLUTION INTRAVENOUS; SUBCUTANEOUS ONCE
Status: CANCELLED | OUTPATIENT
Start: 2023-11-02 | End: 2023-11-02

## 2023-11-02 RX ORDER — GABAPENTIN 300 MG/1
300 CAPSULE ORAL ONCE
Status: CANCELLED | OUTPATIENT
Start: 2023-11-02 | End: 2023-11-02

## 2023-11-02 RX ORDER — ACETAMINOPHEN 325 MG/1
975 TABLET ORAL ONCE
Status: CANCELLED | OUTPATIENT
Start: 2023-11-02 | End: 2023-11-02

## 2023-11-10 ENCOUNTER — HOSPITAL ENCOUNTER (OUTPATIENT)
Dept: RADIOLOGY | Facility: HOSPITAL | Age: 58
Discharge: HOME | End: 2023-11-10
Payer: COMMERCIAL

## 2023-11-10 ENCOUNTER — ANESTHESIA EVENT (OUTPATIENT)
Dept: OPERATING ROOM | Facility: HOSPITAL | Age: 58
DRG: 657 | End: 2023-11-10
Payer: COMMERCIAL

## 2023-11-10 ENCOUNTER — LAB (OUTPATIENT)
Dept: LAB | Facility: LAB | Age: 58
End: 2023-11-10
Payer: COMMERCIAL

## 2023-11-10 DIAGNOSIS — N28.89 RENAL MASS: ICD-10-CM

## 2023-11-10 DIAGNOSIS — N28.89 RENAL MASS: Primary | ICD-10-CM

## 2023-11-10 LAB
ABO GROUP (TYPE) IN BLOOD: NORMAL
ANION GAP SERPL CALC-SCNC: 8 MMOL/L (ref 10–20)
ANTIBODY SCREEN: NORMAL
APTT PPP: 32 SECONDS (ref 27–38)
BUN SERPL-MCNC: 16 MG/DL (ref 6–23)
CALCIUM SERPL-MCNC: 8.8 MG/DL (ref 8.6–10.3)
CHLORIDE SERPL-SCNC: 104 MMOL/L (ref 98–107)
CO2 SERPL-SCNC: 30 MMOL/L (ref 21–32)
CREAT SERPL-MCNC: 0.82 MG/DL (ref 0.5–1.3)
ERYTHROCYTE [DISTWIDTH] IN BLOOD BY AUTOMATED COUNT: 12.3 % (ref 11.5–14.5)
GFR SERPL CREATININE-BSD FRML MDRD: >90 ML/MIN/1.73M*2
GLUCOSE SERPL-MCNC: 89 MG/DL (ref 74–99)
HCT VFR BLD AUTO: 34.1 % (ref 41–52)
HGB BLD-MCNC: 11.2 G/DL (ref 13.5–17.5)
INR PPP: 1 (ref 0.9–1.1)
MCH RBC QN AUTO: 32.3 PG (ref 26–34)
MCHC RBC AUTO-ENTMCNC: 32.8 G/DL (ref 32–36)
MCV RBC AUTO: 98 FL (ref 80–100)
NRBC BLD-RTO: 0 /100 WBCS (ref 0–0)
PLATELET # BLD AUTO: 371 X10*3/UL (ref 150–450)
POTASSIUM SERPL-SCNC: 3.9 MMOL/L (ref 3.5–5.3)
PROTHROMBIN TIME: 10.8 SECONDS (ref 9.8–12.8)
RBC # BLD AUTO: 3.47 X10*6/UL (ref 4.5–5.9)
RH FACTOR (ANTIGEN D): NORMAL
SODIUM SERPL-SCNC: 138 MMOL/L (ref 136–145)
WBC # BLD AUTO: 6.5 X10*3/UL (ref 4.4–11.3)

## 2023-11-10 PROCEDURE — 86901 BLOOD TYPING SEROLOGIC RH(D): CPT

## 2023-11-10 PROCEDURE — 86900 BLOOD TYPING SEROLOGIC ABO: CPT

## 2023-11-10 PROCEDURE — 71046 X-RAY EXAM CHEST 2 VIEWS: CPT | Performed by: RADIOLOGY

## 2023-11-10 PROCEDURE — 86850 RBC ANTIBODY SCREEN: CPT

## 2023-11-10 PROCEDURE — 85027 COMPLETE CBC AUTOMATED: CPT

## 2023-11-10 PROCEDURE — 85730 THROMBOPLASTIN TIME PARTIAL: CPT

## 2023-11-10 PROCEDURE — 71046 X-RAY EXAM CHEST 2 VIEWS: CPT

## 2023-11-10 PROCEDURE — 80048 BASIC METABOLIC PNL TOTAL CA: CPT

## 2023-11-10 PROCEDURE — 86920 COMPATIBILITY TEST SPIN: CPT

## 2023-11-10 PROCEDURE — 36415 COLL VENOUS BLD VENIPUNCTURE: CPT

## 2023-11-10 PROCEDURE — 85610 PROTHROMBIN TIME: CPT

## 2023-11-12 ENCOUNTER — LAB REQUISITION (OUTPATIENT)
Dept: LAB | Facility: HOSPITAL | Age: 58
End: 2023-11-12
Payer: COMMERCIAL

## 2023-11-12 DIAGNOSIS — N28.89 OTHER SPECIFIED DISORDERS OF KIDNEY AND URETER: ICD-10-CM

## 2023-11-12 LAB
ABO GROUP (TYPE) IN BLOOD: NORMAL
ANTIBODY SCREEN: NORMAL
RH FACTOR (ANTIGEN D): NORMAL

## 2023-11-13 ENCOUNTER — HOSPITAL ENCOUNTER (INPATIENT)
Facility: HOSPITAL | Age: 58
LOS: 1 days | Discharge: HOME | DRG: 657 | End: 2023-11-14
Attending: UROLOGY | Admitting: UROLOGY
Payer: COMMERCIAL

## 2023-11-13 ENCOUNTER — APPOINTMENT (OUTPATIENT)
Dept: RADIOLOGY | Facility: HOSPITAL | Age: 58
DRG: 657 | End: 2023-11-13
Payer: COMMERCIAL

## 2023-11-13 ENCOUNTER — ANESTHESIA (OUTPATIENT)
Dept: OPERATING ROOM | Facility: HOSPITAL | Age: 58
DRG: 657 | End: 2023-11-13
Payer: COMMERCIAL

## 2023-11-13 DIAGNOSIS — R10.11 RIGHT UPPER QUADRANT ABDOMINAL PAIN: ICD-10-CM

## 2023-11-13 DIAGNOSIS — Z90.5 HISTORY OF NEPHRECTOMY, RIGHT: ICD-10-CM

## 2023-11-13 DIAGNOSIS — N28.89 RIGHT KIDNEY MASS: ICD-10-CM

## 2023-11-13 DIAGNOSIS — N28.89 RENAL MASS: Primary | ICD-10-CM

## 2023-11-13 LAB
ABO GROUP (TYPE) IN BLOOD: NORMAL
BLOOD EXPIRATION DATE: NORMAL
DISPENSE STATUS: NORMAL
PRODUCT BLOOD TYPE: 6200
PRODUCT CODE: NORMAL
RH FACTOR (ANTIGEN D): NORMAL
UNIT ABO: NORMAL
UNIT NUMBER: NORMAL
UNIT RH: NORMAL
UNIT VOLUME: 350
XM INTEP: NORMAL

## 2023-11-13 PROCEDURE — 1100000001 HC PRIVATE ROOM DAILY

## 2023-11-13 PROCEDURE — 2500000001 HC RX 250 WO HCPCS SELF ADMINISTERED DRUGS (ALT 637 FOR MEDICARE OP): Performed by: UROLOGY

## 2023-11-13 PROCEDURE — 3600000003 HC OR TIME - INITIAL BASE CHARGE - PROCEDURE LEVEL THREE: Performed by: UROLOGY

## 2023-11-13 PROCEDURE — 3700000001 HC GENERAL ANESTHESIA TIME - INITIAL BASE CHARGE: Performed by: UROLOGY

## 2023-11-13 PROCEDURE — 88307 TISSUE EXAM BY PATHOLOGIST: CPT | Performed by: PATHOLOGY

## 2023-11-13 PROCEDURE — 96372 THER/PROPH/DIAG INJ SC/IM: CPT | Performed by: UROLOGY

## 2023-11-13 PROCEDURE — 3700000002 HC GENERAL ANESTHESIA TIME - EACH INCREMENTAL 1 MINUTE: Performed by: UROLOGY

## 2023-11-13 PROCEDURE — 2500000004 HC RX 250 GENERAL PHARMACY W/ HCPCS (ALT 636 FOR OP/ED): Performed by: ANESTHESIOLOGY

## 2023-11-13 PROCEDURE — 0BQN0ZZ REPAIR RIGHT PLEURA, OPEN APPROACH: ICD-10-PCS | Performed by: UROLOGY

## 2023-11-13 PROCEDURE — 3600000008 HC OR TIME - EACH INCREMENTAL 1 MINUTE - PROCEDURE LEVEL THREE: Performed by: UROLOGY

## 2023-11-13 PROCEDURE — 50230 REMOVAL KIDNEY OPEN RADICAL: CPT | Performed by: UROLOGY

## 2023-11-13 PROCEDURE — 2500000005 HC RX 250 GENERAL PHARMACY W/O HCPCS

## 2023-11-13 PROCEDURE — 7100000002 HC RECOVERY ROOM TIME - EACH INCREMENTAL 1 MINUTE: Performed by: UROLOGY

## 2023-11-13 PROCEDURE — 2500000004 HC RX 250 GENERAL PHARMACY W/ HCPCS (ALT 636 FOR OP/ED)

## 2023-11-13 PROCEDURE — 2500000004 HC RX 250 GENERAL PHARMACY W/ HCPCS (ALT 636 FOR OP/ED): Performed by: UROLOGY

## 2023-11-13 PROCEDURE — 88300 SURGICAL PATH GROSS: CPT | Mod: TC,SUR | Performed by: UROLOGY

## 2023-11-13 PROCEDURE — A50220 PR REMV KIDNEY,W/RIB RESECTION

## 2023-11-13 PROCEDURE — 0TB00ZZ EXCISION OF RIGHT KIDNEY, OPEN APPROACH: ICD-10-PCS | Performed by: UROLOGY

## 2023-11-13 PROCEDURE — 99223 1ST HOSP IP/OBS HIGH 75: CPT | Performed by: STUDENT IN AN ORGANIZED HEALTH CARE EDUCATION/TRAINING PROGRAM

## 2023-11-13 PROCEDURE — 88300 SURGICAL PATH GROSS: CPT | Performed by: PATHOLOGY

## 2023-11-13 PROCEDURE — 71045 X-RAY EXAM CHEST 1 VIEW: CPT | Performed by: RADIOLOGY

## 2023-11-13 PROCEDURE — 7100000001 HC RECOVERY ROOM TIME - INITIAL BASE CHARGE: Performed by: UROLOGY

## 2023-11-13 PROCEDURE — A50220 PERIPHERAL IV: Performed by: ANESTHESIOLOGY

## 2023-11-13 PROCEDURE — 2500000004 HC RX 250 GENERAL PHARMACY W/ HCPCS (ALT 636 FOR OP/ED): Performed by: STUDENT IN AN ORGANIZED HEALTH CARE EDUCATION/TRAINING PROGRAM

## 2023-11-13 PROCEDURE — 71045 X-RAY EXAM CHEST 1 VIEW: CPT

## 2023-11-13 PROCEDURE — A4217 STERILE WATER/SALINE, 500 ML: HCPCS | Performed by: UROLOGY

## 2023-11-13 PROCEDURE — 36620 INSERTION CATHETER ARTERY: CPT

## 2023-11-13 PROCEDURE — 76942 ECHO GUIDE FOR BIOPSY: CPT | Performed by: STUDENT IN AN ORGANIZED HEALTH CARE EDUCATION/TRAINING PROGRAM

## 2023-11-13 PROCEDURE — 96372 THER/PROPH/DIAG INJ SC/IM: CPT | Performed by: STUDENT IN AN ORGANIZED HEALTH CARE EDUCATION/TRAINING PROGRAM

## 2023-11-13 PROCEDURE — 2500000005 HC RX 250 GENERAL PHARMACY W/O HCPCS: Performed by: ANESTHESIOLOGY

## 2023-11-13 RX ORDER — DIGOXIN 250 MCG
250 TABLET ORAL DAILY
Status: DISCONTINUED | OUTPATIENT
Start: 2023-11-14 | End: 2023-11-14 | Stop reason: HOSPADM

## 2023-11-13 RX ORDER — OXYCODONE HYDROCHLORIDE 5 MG/1
5 TABLET ORAL EVERY 4 HOURS PRN
Status: DISCONTINUED | OUTPATIENT
Start: 2023-11-13 | End: 2023-11-14 | Stop reason: HOSPADM

## 2023-11-13 RX ORDER — CELECOXIB 200 MG/1
400 CAPSULE ORAL ONCE
Status: COMPLETED | OUTPATIENT
Start: 2023-11-13 | End: 2023-11-13

## 2023-11-13 RX ORDER — HEPARIN SODIUM 5000 [USP'U]/ML
5000 INJECTION, SOLUTION INTRAVENOUS; SUBCUTANEOUS EVERY 8 HOURS
Status: DISCONTINUED | OUTPATIENT
Start: 2023-11-13 | End: 2023-11-14 | Stop reason: HOSPADM

## 2023-11-13 RX ORDER — SERTRALINE HYDROCHLORIDE 100 MG/1
100 TABLET, FILM COATED ORAL DAILY
Status: DISCONTINUED | OUTPATIENT
Start: 2023-11-14 | End: 2023-11-14 | Stop reason: HOSPADM

## 2023-11-13 RX ORDER — LABETALOL HYDROCHLORIDE 5 MG/ML
INJECTION, SOLUTION INTRAVENOUS AS NEEDED
Status: DISCONTINUED | OUTPATIENT
Start: 2023-11-13 | End: 2023-11-13

## 2023-11-13 RX ORDER — SODIUM CHLORIDE 9 MG/ML
INJECTION, SOLUTION INTRAVENOUS CONTINUOUS PRN
Status: DISCONTINUED | OUTPATIENT
Start: 2023-11-13 | End: 2023-11-13

## 2023-11-13 RX ORDER — ACETAMINOPHEN 325 MG/1
975 TABLET ORAL EVERY 6 HOURS
Status: DISCONTINUED | OUTPATIENT
Start: 2023-11-13 | End: 2023-11-14 | Stop reason: HOSPADM

## 2023-11-13 RX ORDER — SODIUM CHLORIDE, SODIUM LACTATE, POTASSIUM CHLORIDE, CALCIUM CHLORIDE 600; 310; 30; 20 MG/100ML; MG/100ML; MG/100ML; MG/100ML
100 INJECTION, SOLUTION INTRAVENOUS CONTINUOUS
Status: DISCONTINUED | OUTPATIENT
Start: 2023-11-13 | End: 2023-11-13 | Stop reason: HOSPADM

## 2023-11-13 RX ORDER — ROPIVACAINE IN 0.9% SOD CHL/PF 0.2 %
6 PLASTIC BAG, INJECTION (ML) EPIDURAL CONTINUOUS
Status: DISCONTINUED | OUTPATIENT
Start: 2023-11-13 | End: 2023-11-14 | Stop reason: HOSPADM

## 2023-11-13 RX ORDER — ROCURONIUM BROMIDE 10 MG/ML
INJECTION, SOLUTION INTRAVENOUS AS NEEDED
Status: DISCONTINUED | OUTPATIENT
Start: 2023-11-13 | End: 2023-11-13

## 2023-11-13 RX ORDER — WATER 1 ML/ML
IRRIGANT IRRIGATION AS NEEDED
Status: DISCONTINUED | OUTPATIENT
Start: 2023-11-13 | End: 2023-11-13 | Stop reason: HOSPADM

## 2023-11-13 RX ORDER — PROPOFOL 10 MG/ML
INJECTION, EMULSION INTRAVENOUS CONTINUOUS PRN
Status: DISCONTINUED | OUTPATIENT
Start: 2023-11-13 | End: 2023-11-13

## 2023-11-13 RX ORDER — KETOROLAC TROMETHAMINE 15 MG/ML
15 INJECTION, SOLUTION INTRAMUSCULAR; INTRAVENOUS EVERY 6 HOURS
Status: DISCONTINUED | OUTPATIENT
Start: 2023-11-13 | End: 2023-11-14 | Stop reason: HOSPADM

## 2023-11-13 RX ORDER — ONDANSETRON HYDROCHLORIDE 2 MG/ML
INJECTION, SOLUTION INTRAVENOUS AS NEEDED
Status: DISCONTINUED | OUTPATIENT
Start: 2023-11-13 | End: 2023-11-13

## 2023-11-13 RX ORDER — HYDROMORPHONE HYDROCHLORIDE 1 MG/ML
INJECTION, SOLUTION INTRAMUSCULAR; INTRAVENOUS; SUBCUTANEOUS AS NEEDED
Status: DISCONTINUED | OUTPATIENT
Start: 2023-11-13 | End: 2023-11-13

## 2023-11-13 RX ORDER — HEPARIN SODIUM 5000 [USP'U]/ML
5000 INJECTION, SOLUTION INTRAVENOUS; SUBCUTANEOUS ONCE
Status: COMPLETED | OUTPATIENT
Start: 2023-11-13 | End: 2023-11-13

## 2023-11-13 RX ORDER — ACETAMINOPHEN 325 MG/1
975 TABLET ORAL ONCE
Status: COMPLETED | OUTPATIENT
Start: 2023-11-13 | End: 2023-11-13

## 2023-11-13 RX ORDER — LIDOCAINE HCL/PF 100 MG/5ML
SYRINGE (ML) INTRAVENOUS AS NEEDED
Status: DISCONTINUED | OUTPATIENT
Start: 2023-11-13 | End: 2023-11-13

## 2023-11-13 RX ORDER — DEXAMETHASONE SODIUM PHOSPHATE 4 MG/ML
INJECTION, SOLUTION INTRA-ARTICULAR; INTRALESIONAL; INTRAMUSCULAR; INTRAVENOUS; SOFT TISSUE AS NEEDED
Status: DISCONTINUED | OUTPATIENT
Start: 2023-11-13 | End: 2023-11-13

## 2023-11-13 RX ORDER — MIDAZOLAM HYDROCHLORIDE 1 MG/ML
INJECTION INTRAMUSCULAR; INTRAVENOUS AS NEEDED
Status: DISCONTINUED | OUTPATIENT
Start: 2023-11-13 | End: 2023-11-13

## 2023-11-13 RX ORDER — ONDANSETRON HYDROCHLORIDE 2 MG/ML
4 INJECTION, SOLUTION INTRAVENOUS ONCE AS NEEDED
Status: DISCONTINUED | OUTPATIENT
Start: 2023-11-13 | End: 2023-11-13 | Stop reason: HOSPADM

## 2023-11-13 RX ORDER — CEFAZOLIN SODIUM 2 G/100ML
2 INJECTION, SOLUTION INTRAVENOUS ONCE
Status: DISCONTINUED | OUTPATIENT
Start: 2023-11-13 | End: 2023-11-13 | Stop reason: HOSPADM

## 2023-11-13 RX ORDER — ONDANSETRON HYDROCHLORIDE 2 MG/ML
4 INJECTION, SOLUTION INTRAVENOUS EVERY 8 HOURS PRN
Status: DISCONTINUED | OUTPATIENT
Start: 2023-11-13 | End: 2023-11-14 | Stop reason: HOSPADM

## 2023-11-13 RX ORDER — ESMOLOL HYDROCHLORIDE 10 MG/ML
INJECTION INTRAVENOUS AS NEEDED
Status: DISCONTINUED | OUTPATIENT
Start: 2023-11-13 | End: 2023-11-13

## 2023-11-13 RX ORDER — FENTANYL CITRATE 50 UG/ML
INJECTION, SOLUTION INTRAMUSCULAR; INTRAVENOUS AS NEEDED
Status: DISCONTINUED | OUTPATIENT
Start: 2023-11-13 | End: 2023-11-13

## 2023-11-13 RX ORDER — LIDOCAINE HYDROCHLORIDE 10 MG/ML
0.1 INJECTION, SOLUTION EPIDURAL; INFILTRATION; INTRACAUDAL; PERINEURAL ONCE
Status: DISCONTINUED | OUTPATIENT
Start: 2023-11-13 | End: 2023-11-13 | Stop reason: HOSPADM

## 2023-11-13 RX ORDER — POLYETHYLENE GLYCOL 3350 17 G/17G
17 POWDER, FOR SOLUTION ORAL DAILY
Status: DISCONTINUED | OUTPATIENT
Start: 2023-11-14 | End: 2023-11-14 | Stop reason: HOSPADM

## 2023-11-13 RX ORDER — SODIUM CHLORIDE 0.9 G/100ML
IRRIGANT IRRIGATION AS NEEDED
Status: DISCONTINUED | OUTPATIENT
Start: 2023-11-13 | End: 2023-11-13 | Stop reason: HOSPADM

## 2023-11-13 RX ORDER — METHOCARBAMOL 100 MG/ML
INJECTION, SOLUTION INTRAMUSCULAR; INTRAVENOUS AS NEEDED
Status: DISCONTINUED | OUTPATIENT
Start: 2023-11-13 | End: 2023-11-13

## 2023-11-13 RX ORDER — ONDANSETRON 4 MG/1
4 TABLET, FILM COATED ORAL EVERY 8 HOURS PRN
Status: DISCONTINUED | OUTPATIENT
Start: 2023-11-13 | End: 2023-11-14 | Stop reason: HOSPADM

## 2023-11-13 RX ORDER — MULTIVIT-MIN/IRON FUM/FOLIC AC 7.5 MG-4
1 TABLET ORAL DAILY
Status: DISCONTINUED | OUTPATIENT
Start: 2023-11-14 | End: 2023-11-14 | Stop reason: HOSPADM

## 2023-11-13 RX ORDER — SODIUM CHLORIDE, SODIUM LACTATE, POTASSIUM CHLORIDE, CALCIUM CHLORIDE 600; 310; 30; 20 MG/100ML; MG/100ML; MG/100ML; MG/100ML
INJECTION, SOLUTION INTRAVENOUS CONTINUOUS PRN
Status: DISCONTINUED | OUTPATIENT
Start: 2023-11-13 | End: 2023-11-13

## 2023-11-13 RX ORDER — FOLIC ACID 1 MG/1
1 TABLET ORAL DAILY
Status: DISCONTINUED | OUTPATIENT
Start: 2023-11-14 | End: 2023-11-14 | Stop reason: HOSPADM

## 2023-11-13 RX ORDER — SODIUM CHLORIDE, SODIUM LACTATE, POTASSIUM CHLORIDE, CALCIUM CHLORIDE 600; 310; 30; 20 MG/100ML; MG/100ML; MG/100ML; MG/100ML
100 INJECTION, SOLUTION INTRAVENOUS CONTINUOUS
Status: DISCONTINUED | OUTPATIENT
Start: 2023-11-13 | End: 2023-11-14

## 2023-11-13 RX ORDER — GABAPENTIN 300 MG/1
300 CAPSULE ORAL ONCE
Status: COMPLETED | OUTPATIENT
Start: 2023-11-13 | End: 2023-11-13

## 2023-11-13 RX ORDER — FENTANYL CITRATE 50 UG/ML
12.5 INJECTION, SOLUTION INTRAMUSCULAR; INTRAVENOUS EVERY 5 MIN PRN
Status: DISCONTINUED | OUTPATIENT
Start: 2023-11-13 | End: 2023-11-13 | Stop reason: HOSPADM

## 2023-11-13 RX ORDER — MIDAZOLAM HYDROCHLORIDE 1 MG/ML
1 INJECTION INTRAMUSCULAR; INTRAVENOUS ONCE AS NEEDED
Status: DISCONTINUED | OUTPATIENT
Start: 2023-11-13 | End: 2023-11-13 | Stop reason: HOSPADM

## 2023-11-13 RX ORDER — PROPOFOL 10 MG/ML
INJECTION, EMULSION INTRAVENOUS AS NEEDED
Status: DISCONTINUED | OUTPATIENT
Start: 2023-11-13 | End: 2023-11-13

## 2023-11-13 RX ORDER — CEFAZOLIN 1 G/1
INJECTION, POWDER, FOR SOLUTION INTRAVENOUS AS NEEDED
Status: DISCONTINUED | OUTPATIENT
Start: 2023-11-13 | End: 2023-11-13

## 2023-11-13 RX ADMIN — HYDROMORPHONE HYDROCHLORIDE 0.5 MG: 2 INJECTION, SOLUTION INTRAMUSCULAR; INTRAVENOUS; SUBCUTANEOUS at 17:33

## 2023-11-13 RX ADMIN — ROCURONIUM BROMIDE 100 MG: 50 INJECTION, SOLUTION INTRAVENOUS at 08:06

## 2023-11-13 RX ADMIN — HYDROMORPHONE HYDROCHLORIDE 0.2 MG: 1 INJECTION, SOLUTION INTRAMUSCULAR; INTRAVENOUS; SUBCUTANEOUS at 10:08

## 2023-11-13 RX ADMIN — METHOCARBAMOL 250 MG: 100 INJECTION INTRAMUSCULAR; INTRAVENOUS at 10:14

## 2023-11-13 RX ADMIN — HEPARIN SODIUM 5000 UNITS: 5000 INJECTION, SOLUTION INTRAVENOUS; SUBCUTANEOUS at 22:42

## 2023-11-13 RX ADMIN — ESMOLOL HYDROCHLORIDE 40 MG: 100 INJECTION, SOLUTION INTRAVENOUS at 09:32

## 2023-11-13 RX ADMIN — ESMOLOL HYDROCHLORIDE 30 MG: 100 INJECTION, SOLUTION INTRAVENOUS at 09:07

## 2023-11-13 RX ADMIN — FENTANYL CITRATE 50 MCG: 50 INJECTION, SOLUTION INTRAMUSCULAR; INTRAVENOUS at 09:12

## 2023-11-13 RX ADMIN — LABETALOL HYDROCHLORIDE 5 MG: 5 INJECTION, SOLUTION INTRAVENOUS at 09:53

## 2023-11-13 RX ADMIN — HEPARIN SODIUM 5000 UNITS: 5000 INJECTION, SOLUTION INTRAVENOUS; SUBCUTANEOUS at 07:46

## 2023-11-13 RX ADMIN — Medication 4 L/MIN: at 12:00

## 2023-11-13 RX ADMIN — ACETAMINOPHEN 975 MG: 325 TABLET ORAL at 22:42

## 2023-11-13 RX ADMIN — LABETALOL HYDROCHLORIDE 5 MG: 5 INJECTION, SOLUTION INTRAVENOUS at 11:07

## 2023-11-13 RX ADMIN — LABETALOL HYDROCHLORIDE 5 MG: 5 INJECTION, SOLUTION INTRAVENOUS at 10:48

## 2023-11-13 RX ADMIN — Medication 50 MG: at 08:35

## 2023-11-13 RX ADMIN — HYDROMORPHONE HYDROCHLORIDE 0.5 MG: 2 INJECTION, SOLUTION INTRAMUSCULAR; INTRAVENOUS; SUBCUTANEOUS at 11:49

## 2023-11-13 RX ADMIN — LIDOCAINE HYDROCHLORIDE 60 MG: 20 INJECTION INTRAVENOUS at 08:05

## 2023-11-13 RX ADMIN — Medication 3 L/MIN: at 13:00

## 2023-11-13 RX ADMIN — Medication 10 MG: at 09:39

## 2023-11-13 RX ADMIN — SODIUM CHLORIDE: 9 INJECTION, SOLUTION INTRAVENOUS at 08:06

## 2023-11-13 RX ADMIN — HYDROMORPHONE HYDROCHLORIDE 0.5 MG: 2 INJECTION, SOLUTION INTRAMUSCULAR; INTRAVENOUS; SUBCUTANEOUS at 11:55

## 2023-11-13 RX ADMIN — PROPOFOL 50 MG: 10 INJECTION, EMULSION INTRAVENOUS at 09:17

## 2023-11-13 RX ADMIN — ACETAMINOPHEN 975 MG: 325 TABLET ORAL at 07:43

## 2023-11-13 RX ADMIN — ESMOLOL HYDROCHLORIDE 30 MG: 100 INJECTION, SOLUTION INTRAVENOUS at 09:24

## 2023-11-13 RX ADMIN — METHOCARBAMOL 250 MG: 100 INJECTION INTRAMUSCULAR; INTRAVENOUS at 10:19

## 2023-11-13 RX ADMIN — HYDROMORPHONE HYDROCHLORIDE 0.4 MG: 1 INJECTION, SOLUTION INTRAMUSCULAR; INTRAVENOUS; SUBCUTANEOUS at 11:10

## 2023-11-13 RX ADMIN — SODIUM CHLORIDE, POTASSIUM CHLORIDE, SODIUM LACTATE AND CALCIUM CHLORIDE 100 ML/HR: 600; 310; 30; 20 INJECTION, SOLUTION INTRAVENOUS at 22:41

## 2023-11-13 RX ADMIN — FENTANYL CITRATE 50 MCG: 50 INJECTION, SOLUTION INTRAMUSCULAR; INTRAVENOUS at 09:33

## 2023-11-13 RX ADMIN — KETOROLAC TROMETHAMINE 15 MG: 15 INJECTION, SOLUTION INTRAMUSCULAR; INTRAVENOUS at 22:41

## 2023-11-13 RX ADMIN — METHOCARBAMOL 250 MG: 100 INJECTION INTRAMUSCULAR; INTRAVENOUS at 09:59

## 2023-11-13 RX ADMIN — FENTANYL CITRATE 100 MCG: 50 INJECTION, SOLUTION INTRAMUSCULAR; INTRAVENOUS at 08:05

## 2023-11-13 RX ADMIN — Medication 10 MG: at 10:32

## 2023-11-13 RX ADMIN — ROCURONIUM BROMIDE 20 MG: 50 INJECTION, SOLUTION INTRAVENOUS at 09:16

## 2023-11-13 RX ADMIN — ROCURONIUM BROMIDE 20 MG: 50 INJECTION, SOLUTION INTRAVENOUS at 09:55

## 2023-11-13 RX ADMIN — ONDANSETRON 4 MG: 2 INJECTION INTRAMUSCULAR; INTRAVENOUS at 10:26

## 2023-11-13 RX ADMIN — HYDROMORPHONE HYDROCHLORIDE 0.2 MG: 2 INJECTION, SOLUTION INTRAMUSCULAR; INTRAVENOUS; SUBCUTANEOUS at 13:05

## 2023-11-13 RX ADMIN — HYDROMORPHONE HYDROCHLORIDE 0.5 MG: 2 INJECTION, SOLUTION INTRAMUSCULAR; INTRAVENOUS; SUBCUTANEOUS at 17:55

## 2023-11-13 RX ADMIN — ROCURONIUM BROMIDE 20 MG: 50 INJECTION, SOLUTION INTRAVENOUS at 08:40

## 2023-11-13 RX ADMIN — Medication 4 L/MIN: at 11:30

## 2023-11-13 RX ADMIN — DEXAMETHASONE SODIUM PHOSPHATE 4 MG: 4 INJECTION, SOLUTION INTRA-ARTICULAR; INTRALESIONAL; INTRAMUSCULAR; INTRAVENOUS; SOFT TISSUE at 08:17

## 2023-11-13 RX ADMIN — SODIUM CHLORIDE, POTASSIUM CHLORIDE, SODIUM LACTATE AND CALCIUM CHLORIDE: 600; 310; 30; 20 INJECTION, SOLUTION INTRAVENOUS at 10:37

## 2023-11-13 RX ADMIN — SODIUM CHLORIDE, POTASSIUM CHLORIDE, SODIUM LACTATE AND CALCIUM CHLORIDE: 600; 310; 30; 20 INJECTION, SOLUTION INTRAVENOUS at 07:33

## 2023-11-13 RX ADMIN — PROPOFOL 150 MG: 10 INJECTION, EMULSION INTRAVENOUS at 08:05

## 2023-11-13 RX ADMIN — PROPOFOL 50 MCG/KG/MIN: 10 INJECTION, EMULSION INTRAVENOUS at 09:26

## 2023-11-13 RX ADMIN — METHOCARBAMOL 250 MG: 100 INJECTION INTRAMUSCULAR; INTRAVENOUS at 10:09

## 2023-11-13 RX ADMIN — Medication 8 L/MIN: at 11:15

## 2023-11-13 RX ADMIN — SUGAMMADEX 200 MG: 100 INJECTION, SOLUTION INTRAVENOUS at 11:05

## 2023-11-13 RX ADMIN — MIDAZOLAM HYDROCHLORIDE 2 MG: 1 INJECTION, SOLUTION INTRAMUSCULAR; INTRAVENOUS at 08:00

## 2023-11-13 RX ADMIN — GABAPENTIN 300 MG: 300 CAPSULE ORAL at 07:44

## 2023-11-13 RX ADMIN — CELECOXIB 400 MG: 200 CAPSULE ORAL at 07:45

## 2023-11-13 RX ADMIN — LIDOCAINE HYDROCHLORIDE 50 MG: 20 INJECTION INTRAVENOUS at 08:22

## 2023-11-13 RX ADMIN — CEFAZOLIN 2 G: 1 INJECTION, POWDER, FOR SOLUTION INTRAMUSCULAR; INTRAVENOUS at 08:17

## 2023-11-13 SDOH — HEALTH STABILITY: MENTAL HEALTH: CURRENT SMOKER: 1

## 2023-11-13 ASSESSMENT — PAIN - FUNCTIONAL ASSESSMENT

## 2023-11-13 ASSESSMENT — PAIN SCALES - GENERAL
PAINLEVEL_OUTOF10: 0 - NO PAIN
PAINLEVEL_OUTOF10: 7
PAINLEVEL_OUTOF10: 2
PAINLEVEL_OUTOF10: 2
PAINLEVEL_OUTOF10: 3
PAINLEVEL_OUTOF10: 4
PAINLEVEL_OUTOF10: 2
PAINLEVEL_OUTOF10: 0 - NO PAIN
PAINLEVEL_OUTOF10: 4
PAINLEVEL_OUTOF10: 8
PAINLEVEL_OUTOF10: 8
PAINLEVEL_OUTOF10: 9
PAINLEVEL_OUTOF10: 0 - NO PAIN
PAINLEVEL_OUTOF10: 8
PAINLEVEL_OUTOF10: 4
PAINLEVEL_OUTOF10: 0 - NO PAIN
PAINLEVEL_OUTOF10: 9
PAINLEVEL_OUTOF10: 6
PAINLEVEL_OUTOF10: 6
PAINLEVEL_OUTOF10: 2
PAINLEVEL_OUTOF10: 4

## 2023-11-13 ASSESSMENT — LIFESTYLE VARIABLES
NAUSEA AND VOMITING: NO NAUSEA AND NO VOMITING
AUDITORY DISTURBANCES: NOT PRESENT
TREMOR: NO TREMOR
AGITATION: NORMAL ACTIVITY
ORIENTATION AND CLOUDING OF SENSORIUM: ORIENTED AND CAN DO SERIAL ADDITIONS
ANXIETY: NO ANXIETY, AT EASE
VISUAL DISTURBANCES: NOT PRESENT
TOTAL SCORE: 0
PAROXYSMAL SWEATS: NO SWEAT VISIBLE
HEADACHE, FULLNESS IN HEAD: NOT PRESENT

## 2023-11-13 ASSESSMENT — PAIN DESCRIPTION - DESCRIPTORS
DESCRIPTORS: SORE

## 2023-11-13 ASSESSMENT — PAIN DESCRIPTION - LOCATION
LOCATION: INCISION
LOCATION: INCISION

## 2023-11-13 NOTE — ANESTHESIA POSTPROCEDURE EVALUATION
Patient: Kostas Ruff    Procedure Summary       Date: 11/13/23 Room / Location: [facility] OR 02 / Virtual [facility] OR    Anesthesia Start: 0750 Anesthesia Stop:     Procedure: Nephrectomy Partial (Right: Abdomen) Diagnosis:       Renal mass      (Renal mass [N28.89])    Surgeons: Rico Arriaga MD Responsible Provider: Magan Dinero MD    Anesthesia Type: general ASA Status: 3            Anesthesia Type: general    Vitals Value Taken Time   /75 11/13/23 1010   Temp 36.5 11/13/23 1010   Pulse 62 11/13/23 1010   Resp 16 11/13/23 1010   SpO2 94 11/13/23 1010       Anesthesia Post Evaluation    No notable events documented.

## 2023-11-13 NOTE — OP NOTE
Nephrectomy Radical (R) Operative Note     Date: 2023  OR Location: [facility] OR    Name: Kostas Ruff, : 1965, Age: 58 y.o., MRN: [id number], Sex: male    Diagnosis  Pre-op Diagnosis     * Renal mass [N28.89] Post-op Diagnosis     * Renal mass [N28.89]     Procedures  RIGHT open radical nephrectomy, repair of pleural injury    Surgeons      * Rico Arriaga - Primary    Resident/Fellow/Other Assistant:  Surgeon(s) and Role:     * Dolly Orozco MD - Assisting  Robyn Lake - Assisting    Procedure Summary  Anesthesia: General  ASA: III  Anesthesia Staff: Anesthesiologist: Magan Dinero MD  CRNA: MILEY MccarthyCRNA; NORTH Cabral  SRNA: NORTH Kim  Estimated Blood Loss: 100mL  Intra-op Medications:   Medication Name Total Dose   sterile water irrigation solution 3,000 mL   sodium chloride 0.9 % irrigation solution 1,000 mL   acetaminophen (Tylenol) tablet 975 mg 975 mg   celecoxib (CeleBREX) capsule 400 mg 400 mg   gabapentin (Neurontin) capsule 300 mg 300 mg   heparin (porcine) injection 5,000 Units 5,000 Units              Anesthesia Record               Intraprocedure I/O Totals          Intake    Ketamine 0.00 mL    The total shown is the total volume documented since Anesthesia Start was filed.    Propofol Drip 0.00 mL    The total shown is the total volume documented since Anesthesia Start was filed.    lactated Ringer's 900.00 mL    sodium chloride 0.9% 900.00 mL    Total Intake 1800 mL       Output    Urine 295 mL    Est. Blood Loss 100 mL    Total Output 395 mL       Net    Net Volume 1405 mL          Specimen:   ID Type Source Tests Collected by Time   1 : RIGHT KIDNEY Tissue KIDNEY RADICAL NEPHRECTOMY RIGHT SURGICAL PATHOLOGY EXAM Rico Arriaga MD 2023 1016   2 : 11TH RIGHT RIB, GROSS ONLY Tissue RIB RIGHT SURGICAL PATHOLOGY EXAM Rico Arriaga MD 2023 1027        Staff:   Circulator: Abran Lopez RN; Mone Leo RN  Scrub Person: Jake OLIVERA  Gerald RN         Drains and/or Catheters:   Urethral Catheter Non-latex;Straight-tip 16 Fr. (Active)         Findings:   - Large perinephric hematoma with ruptured renal mass and necrotic appearing tissue noted  - Serosal injury on colon with electrocautery  - Pleural injury repaired    Indications: Kostas Ruff is an 58 y.o. male who is having surgery for Renal mass [N28.89].     After obtaining patient was brought to the operating room and placed in supine position on operating room table.  A timeout was performed and all were in agreement.  Patient underwent general anesthesia without complication and was repositioned in lateral recumbent with the right side up.  All pressure points were carefully padded.  He was then prepped and draped in the usual sterile fashion.  At the level of the 11th rib we made a flank incision along the trajectory of the 11th rib towards the umbilicus to the lateral border of the rectus.  We carefully dissected using Bovie electrocautery onto the 11th rib and then used electrocautery to divide the muscle over the 11th rib.  An osteotome was used to sharply dissect the neurovascular bundle off the inferior aspect of the 11th rib and dissected the intercostal muscles off of the 11th rib.  We then used a Doyan retractor to come around the 11th rib and further dissected free.  Once this had been accomplished the  was used to divide the 11th rib.  Bovie electrocautery was used to obtain hemostasis of the proximal end of the rib.  The rib was sent off the field for pathology.  At the site of the tip of the 11th rib we used Bovie electrocautery to rowley the fascia.  At that time it was noted that we had made a peritoneal incision with a serosal injury to the colon below.  The colon was repaired with #1 Vicryl in interrupted fashion and the peritoneum was repaired with 0 Vicryl in running fashion.  We then used a sponge stick to bluntly drop the peritoneum off the anterior  abdominal wall.  We then completed our incision to the lateral border of the rectus.  We quickly identified Gerota's fascia and the retroperitoneum which was tense and bulging with hematoma inside Gerota's fascia.  Inferior to the kidney we identified the ureter and placed a vessel loop around it for identification.  At the lateral aspect of the kidney we used Bovie electrocautery to open Gerota's fascia to continue to medialize the retroperitoneum.  Upon opening Gerota's fascia it became obvious that there was a large volume of well organized hematoma within Gerota's.  It was further noted that the tumor had previously ruptured with contents contained within Gerota's fascia.  Upon inspecting the upper pole renal parenchyma after we entered Gerota's, yellow implants were noted on the kidney surface which were concerning for implants of renal mass.  Given these findings it was deemed appropriate to proceed with a radical nephrectomy.  We bluntly dissected the kidney free posteriorly and used Bovie electrocautery to free the superior aspects of the right kidney below the liver.  We identified the renal hilum with 1 artery and 1 vein.  Each of these encircled with a vessel loop and tagged.  Starting with the artery, 0 silk ties were used to ligate the artery x2.  Large clips were then placed on both the proximal and distal aspects of the renal artery.  This was divided with Metzenbaum scissors with good hemostasis.  We then turned our attention to the renal vein which was also ligated with 0 silk ties x2 and large clips x2.  The vein was divided with Metzenbaum scissors and good hemostasis was noted.  The ureter was ligated with 0 silk tie distally and a clip proximally then transected.  The kidney was entirely freed and was sent off the field for pathology.  Given the appearance of tumor rupture we copiously irrigated the wound with several liters of water.  Meticulous hemostasis was noted of the nephrectomy bed.  At  this time it was noted that there was a small diaphragm and pleural injury at the level of the 11th rib.  A 3-0 Vicryl running suture was used to repair this.  Prior to placing the final stitch a red rubber catheter was placed into the pleural space and the distal end was submerged under saline for a water seal.  Anesthesia administered several large breaths to evacuate the pleural air.  The catheter was withdrawn as the Vicryl suture was tied.  We again inspected our operative bed and hemostasis was meticulous.  The bed was taken out of flexed position and the fascia was closed in 2 layers of bidirectional #1 PDS.  The subcutaneous tissue was copiously irrigated again and then closed with 4-0 Monocryl and Dermabond.  The patient was awoken from anesthesia and transferred the PACU in stable condition.      Procedure Details: as above  Complications:  None; patient tolerated the procedure well.    Disposition: PACU - hemodynamically stable.  Condition: stable         Additional Details: CXR in PACU    Attending Attestation: I was present and scrubbed for the entire procedure.    Rico Arriaga  Phone Number: [phone]

## 2023-11-13 NOTE — ANESTHESIA PREPROCEDURE EVALUATION
Patient: Kostas Ruff    Procedure Information       Date/Time: 11/13/23 0715    Procedure: Nephrectomy Partial (Right) - pre-op block per anesthesia    Location: [facility] OR 02 / Virtual [facility] OR    Surgeons: Rico Arriaga MD            Relevant Problems   Anesthesia (within normal limits)      Cardiovascular   (+) Afib (CMS/HCC)      Neuro/Psych   (+) Anxiety disorder      Genitourinary   (+) Renal mass     Vitals:    11/13/23 0628   BP: 153/80   Pulse: 77   Resp: 16   Temp: 37.1 °C (98.7 °F)   SpO2: 95%       Past Surgical History:   Procedure Laterality Date    OTHER SURGICAL HISTORY  03/09/2020    Cyst excision    OTHER SURGICAL HISTORY  03/09/2020    Back surgery    OTHER SURGICAL HISTORY  03/09/2020    Knee surgery     Past Medical History:   Diagnosis Date    A-fib (CMS/[facility])     Acute upper respiratory infection, unspecified 03/17/2020    Acute URI    Anxiety     Encounter for screening for malignant neoplasm of colon 05/21/2020    Screening for colorectal cancer    Herpes zoster     Personal history of other infectious and parasitic diseases 08/06/2021    History of herpes zoster       Current Facility-Administered Medications:     acetaminophen (Tylenol) tablet 975 mg, 975 mg, oral, Once, Rico Arriaga MD    ceFAZolin in dextrose (iso-os) (Ancef) IVPB 2 g, 2 g, intravenous, Once, Rico Arriaga MD    celecoxib (CeleBREX) capsule 400 mg, 400 mg, oral, Once, Rico Arriaga MD    gabapentin (Neurontin) capsule 300 mg, 300 mg, oral, Once, Rico Arriaga MD    heparin (porcine) injection 5,000 Units, 5,000 Units, subcutaneous, Once, Rico Arriaga MD  Prior to Admission medications    Medication Sig Start Date End Date Taking? Authorizing Provider   digoxin (Lanoxin) 250 MCG tab;et Take 1 tablet (250 mcg) by mouth once daily. 10/30/23 10/29/24  Bethany Vieira MD   naproxen (Naprosyn) 500 mg tablet Take 1 tablet (500 mg) by mouth 2 times a day as needed for mild pain (1 - 3) for up to 15 days. 10/26/23  11/10/23  Chay Lemos APRN-CNP   sertraline (Zoloft) 100 mg tablet Take 1 tablet (100 mg) by mouth once daily. 10/30/23 10/24/24  Bethany Vieira MD     No Known Allergies  Social History     Tobacco Use    Smoking status: Every Day     Packs/day: .5     Types: Cigarettes    Smokeless tobacco: Former     Types: Chew   Substance Use Topics    Alcohol use: Yes     Alcohol/week: 35.0 standard drinks of alcohol     Types: 35 Cans of beer per week         Chemistry    Lab Results   Component Value Date/Time     11/10/2023 1312    K 3.9 11/10/2023 1312     11/10/2023 1312    CO2 30 11/10/2023 1312    BUN 16 11/10/2023 1312    CREATININE 0.82 11/10/2023 1312    Lab Results   Component Value Date/Time    CALCIUM 8.8 11/10/2023 1312    ALKPHOS 59 10/26/2023 1242    AST 12 10/26/2023 1242    ALT 12 10/26/2023 1242    BILITOT 0.4 10/26/2023 1242          Lab Results   Component Value Date/Time    WBC 6.5 11/10/2023 1312    HGB 11.2 (L) 11/10/2023 1312    HCT 34.1 (L) 11/10/2023 1312     11/10/2023 1312     Lab Results   Component Value Date/Time    PROTIME 10.8 11/10/2023 1312    INR 1.0 11/10/2023 1312     No results found for this or any previous visit (from the past 4464 hour(s)).  No results found for this or any previous visit from the past 1095 days.      Tobacco  Allergies    Med Hx  Surg Hx   Fam Hx  Soc Hx        NPO Detail:  NPO/Void Status  Date of Last Liquid: 11/13/23  Time of Last Liquid: 0000  Date of Last Solid: 11/13/23  Time of Last Solid: 0000         Physical Exam    Airway  Mallampati: III  Neck ROM: full     Cardiovascular - normal exam     Dental - normal exam     Pulmonary - normal exam     Abdominal - normal exam             Anesthesia Plan    ASA 3     general     The patient is a current smoker.  Education provided regarding risk of obstructive sleep apnea.  intravenous induction   Postoperative administration of opioids is intended.  Trial extubation is  planned.  Anesthetic plan and risks discussed with patient.  Use of blood products discussed with patient who consented to blood products.    Plan discussed with CRNA and attending.      Patient has significant EtOH and smoking history.  Self reports 35 drinks / week.

## 2023-11-13 NOTE — CONSULTS
Consults  Acute Pain Service    Kostas Ruff is a 58 y.o. year old male patient who presents for [ ] with [ ]. Acute Pain consulted for block for postoperative pain control.     Anticipated Postop Pain Issues -   Palliative: typically relieved with IV analgesics and regional local anesthetics  Provocative: typically with movement  Quality: typically burning and aching  Radiation: typically none  Severity: typically severe 8-10/10  Timing: typically constant    Past Medical History:   Diagnosis Date    A-fib (CMS/HCC)     Acute upper respiratory infection, unspecified 03/17/2020    Acute URI    Anxiety     Encounter for screening for malignant neoplasm of colon 05/21/2020    Screening for colorectal cancer    Herpes zoster     Personal history of other infectious and parasitic diseases 08/06/2021    History of herpes zoster      Past Surgical History:   Procedure Laterality Date    OTHER SURGICAL HISTORY  03/09/2020    Cyst excision    OTHER SURGICAL HISTORY  03/09/2020    Back surgery    OTHER SURGICAL HISTORY  03/09/2020    Knee surgery     Family History   Problem Relation Name Age of Onset    No Known Problems Mother      No Known Problems Father        Social History     Tobacco Use    Smoking status: Every Day     Packs/day: .5     Types: Cigarettes    Smokeless tobacco: Former     Types: Chew   Vaping Use    Vaping Use: Never used   Substance Use Topics    Alcohol use: Yes     Alcohol/week: 35.0 standard drinks of alcohol     Types: 35 Cans of beer per week    Drug use: Yes     Types: Marijuana     Comment: yesterday evening     No Known Allergies     Review of Systems  Gen: No fatigue, anorexia, insomnia, fever.   Eyes: No vision loss, double vision, drainage, eye pain.   ENT: No pharyngitis, dry mouth, no hearing changes or ear discharge  Cardiac: No chest pain, palpitations, syncope, near syncope.   Pulmonary: No shortness of breath, cough, hemoptysis.   Heme/lymph: No swollen glands, fever, bleeding.    GI: No abdominal pain, change in bowel habits, melena, hematemesis, hematochezia, nausea, vomiting, diarrhea.   : No discharge, dysuria, frequency, urgency, hematuria.  Endo: No polyuria or weight loss.   Musculoskeletal: Negative for any pain or loss of ROM/weakness  Skin: No rashes or lesions  Neuro: Normal speech, no numbness or weakness. No gait difficulties  Review of systems is otherwise negative unless stated above or in history of present illness.    Physical Exam:  Constitutional:  no distress, alert and cooperative  Eyes: clear sclera  Head/Neck: No apparent injury, trachea midline  Respiratory/Thorax: Patent airways, thorax symmetric, breathing comfortably  Cardiovascular: no pitting edema  Gastrointestinal: Nondistended  Musculoskeletal: ROM intact  Extremities: no clubbing  Neurological: alert, putnam x4  Psychological: Appropriate affect    No results found for this or any previous visit (from the past 24 hour(s)).     Plan:    - BL BOAZ blocks performed preoperatively on 11/13/23  - Ambit ball with Ropivacaine 0.2%/NaCl 0.9% 500mL, Rate 7 cc/hr bilaterally  - Ambit medication will not interfere with pain medication prescribed by the primary team.   - Please be aware of local anesthetic toxic dose and absorption variability before considering lidocaine patches  - Acute pain service will follow while catheters in place  - Rest of pain management per primary team    Acute Pain Resident  pg [phone] ph [phone]

## 2023-11-13 NOTE — ANESTHESIA PROCEDURE NOTES
Peripheral IV  Date/Time: 11/13/2023 6:55 AM  Inserted by: NORTH Kim    Placement  Needle size: 20 G  Laterality: right  Location: forearm  Local anesthetic: none  Site prep: chlorhexidine  Technique: anatomical landmarks  Attempts: 1

## 2023-11-13 NOTE — ANESTHESIA PROCEDURE NOTES
Peripheral IV  Date/Time: 11/13/2023 8:06 AM  Inserted by: JAMEE Cabral-CRNA    Placement  Needle size: 16 G  Laterality: left  Location: hand  Local anesthetic: none  Site prep: alcohol  Technique: anatomical landmarks  Attempts: 1

## 2023-11-13 NOTE — ANESTHESIA PROCEDURE NOTES
Arterial Line:    Date/Time: 11/13/2023 8:05 AM    Staffing  Performed: LEVI   Authorized by: Magan Dinero MD    Performed by: JAMEE Cabral-CRNA    An arterial line was placed. Procedure performed using surface landmarks.in the OR for the following indication(s): continuous blood pressure monitoring and blood sampling needed.    A 20 gauge (size) (length), Angiocath (type) catheter was placed into the Left radial artery, secured by Tegaderm,   Seldinger technique used.  Events:  patient tolerated procedure well with no complications.

## 2023-11-13 NOTE — ANESTHESIA PROCEDURE NOTES
Airway  Date/Time: 11/13/2023 8:07 AM  Urgency: elective    Airway not difficult    Staffing  Performed: SRNA   Authorized by: Magan Dinero MD    Performed by: JAMEE Cabral-RAMÓN  Patient location during procedure: OR    Indications and Patient Condition  Indications for airway management: anesthesia and airway protection  Spontaneous Ventilation: absent  Sedation level: deep  Preoxygenated: yes  Patient position: sniffing  MILS maintained throughout  Mask difficulty assessment: 2 - vent by mask + OA or adjuvant +/- NMBA  Planned trial extubation    Final Airway Details  Final airway type: endotracheal airway      Successful airway: ETT  Cuffed: yes   Successful intubation technique: video laryngoscopy  Facilitating devices/methods: intubating stylet  Endotracheal tube insertion site: oral  Blade size: #4  ETT size (mm): 7.5  Cormack-Lehane Classification: grade I - full view of glottis  Placement verified by: chest auscultation and capnometry   Cuff volume (mL): 7  Measured from: teeth  ETT to teeth (cm): 23  Number of attempts at approach: 2  Ventilation between attempts: BVM  Number of other approaches attempted: 1    Other Attempts  Unsuccessful attempted airways: endotracheal tube  Unsuccessful attempted endotracheal techniques: direct laryngoscopy

## 2023-11-13 NOTE — PROCEDURES
Peripheral Block    Patient location during procedure: pre-op  Start time: 11/13/2023 7:13 AM  End time: 11/13/2023 7:20 AM  Reason for block: post-op pain management  Staffing  Performed: resident   Authorized by: Shannon Jack MD    Performed by: Shannon Jack MD  Preanesthetic Checklist  Completed: patient identified, IV checked, site marked, risks and benefits discussed, surgical consent, monitors and equipment checked, pre-op evaluation and timeout performed   Timeout performed at:   Peripheral Block  Patient position: sitting  Prep: ChloraPrep  Patient monitoring: heart rate and continuous pulse ox  Block type: BOAZ  Laterality: B/L  Injection technique: catheter  Guidance: ultrasound guided  Local infiltration: lidocaine  Needle  Needle type: Tuohy   Needle gauge: 26 G  Needle length: 8 cm  Needle localization: ultrasound guidance     image stored in chart  Assessment  Injection assessment: negative aspiration for heme, no paresthesia on injection, incremental injection and local visualized surrounding nerve on ultrasound  Additional Notes  Erector spinae plane block: Informed consent obtained.  Risks, benefits, and alternatives discussed.  ASA monitors placed, and timeout performed.  Patient positioned, prepped with chlorhexidine, and draped with sterile towels.  Ultrasound guidance used to visualize the erector spine a muscle above the TP/costal junction at T7.  Good visualization of the needle throughout duration of the procedure.  Aspiration was negative.  15 cc of 0.5 percent ropivacaine injected with visualization of spread bilaterally.  Catheters threaded and secured. Patient tolerated procedure well.    Timeout by RN

## 2023-11-14 ENCOUNTER — APPOINTMENT (OUTPATIENT)
Dept: RADIOLOGY | Facility: HOSPITAL | Age: 58
DRG: 657 | End: 2023-11-14
Payer: COMMERCIAL

## 2023-11-14 VITALS
HEART RATE: 59 BPM | HEIGHT: 68 IN | RESPIRATION RATE: 20 BRPM | OXYGEN SATURATION: 96 % | TEMPERATURE: 97.2 F | DIASTOLIC BLOOD PRESSURE: 74 MMHG | SYSTOLIC BLOOD PRESSURE: 138 MMHG | WEIGHT: 140.65 LBS | BODY MASS INDEX: 21.32 KG/M2

## 2023-11-14 LAB
ANION GAP SERPL CALC-SCNC: 13 MMOL/L (ref 10–20)
BUN SERPL-MCNC: 14 MG/DL (ref 6–23)
CALCIUM SERPL-MCNC: 8.4 MG/DL (ref 8.6–10.6)
CHLORIDE SERPL-SCNC: 107 MMOL/L (ref 98–107)
CO2 SERPL-SCNC: 27 MMOL/L (ref 21–32)
CREAT SERPL-MCNC: 0.9 MG/DL (ref 0.5–1.3)
ERYTHROCYTE [DISTWIDTH] IN BLOOD BY AUTOMATED COUNT: 12.5 % (ref 11.5–14.5)
GFR SERPL CREATININE-BSD FRML MDRD: >90 ML/MIN/1.73M*2
GLUCOSE SERPL-MCNC: 86 MG/DL (ref 74–99)
HCT VFR BLD AUTO: 33.6 % (ref 41–52)
HGB BLD-MCNC: 10.9 G/DL (ref 13.5–17.5)
MCH RBC QN AUTO: 32.8 PG (ref 26–34)
MCHC RBC AUTO-ENTMCNC: 32.4 G/DL (ref 32–36)
MCV RBC AUTO: 101 FL (ref 80–100)
NRBC BLD-RTO: 0 /100 WBCS (ref 0–0)
PLATELET # BLD AUTO: 335 X10*3/UL (ref 150–450)
POTASSIUM SERPL-SCNC: 4.5 MMOL/L (ref 3.5–5.3)
RBC # BLD AUTO: 3.32 X10*6/UL (ref 4.5–5.9)
SODIUM SERPL-SCNC: 142 MMOL/L (ref 136–145)
WBC # BLD AUTO: 7.6 X10*3/UL (ref 4.4–11.3)

## 2023-11-14 PROCEDURE — 2500000004 HC RX 250 GENERAL PHARMACY W/ HCPCS (ALT 636 FOR OP/ED): Performed by: STUDENT IN AN ORGANIZED HEALTH CARE EDUCATION/TRAINING PROGRAM

## 2023-11-14 PROCEDURE — 71045 X-RAY EXAM CHEST 1 VIEW: CPT

## 2023-11-14 PROCEDURE — 2500000001 HC RX 250 WO HCPCS SELF ADMINISTERED DRUGS (ALT 637 FOR MEDICARE OP)

## 2023-11-14 PROCEDURE — 36415 COLL VENOUS BLD VENIPUNCTURE: CPT | Performed by: STUDENT IN AN ORGANIZED HEALTH CARE EDUCATION/TRAINING PROGRAM

## 2023-11-14 PROCEDURE — 85027 COMPLETE CBC AUTOMATED: CPT | Performed by: STUDENT IN AN ORGANIZED HEALTH CARE EDUCATION/TRAINING PROGRAM

## 2023-11-14 PROCEDURE — 71045 X-RAY EXAM CHEST 1 VIEW: CPT | Performed by: RADIOLOGY

## 2023-11-14 PROCEDURE — 2500000004 HC RX 250 GENERAL PHARMACY W/ HCPCS (ALT 636 FOR OP/ED)

## 2023-11-14 PROCEDURE — 96372 THER/PROPH/DIAG INJ SC/IM: CPT | Performed by: STUDENT IN AN ORGANIZED HEALTH CARE EDUCATION/TRAINING PROGRAM

## 2023-11-14 PROCEDURE — 99231 SBSQ HOSP IP/OBS SF/LOW 25: CPT | Performed by: STUDENT IN AN ORGANIZED HEALTH CARE EDUCATION/TRAINING PROGRAM

## 2023-11-14 PROCEDURE — 71045 X-RAY EXAM CHEST 1 VIEW: CPT | Mod: FY

## 2023-11-14 PROCEDURE — 2500000001 HC RX 250 WO HCPCS SELF ADMINISTERED DRUGS (ALT 637 FOR MEDICARE OP): Performed by: STUDENT IN AN ORGANIZED HEALTH CARE EDUCATION/TRAINING PROGRAM

## 2023-11-14 PROCEDURE — 80048 BASIC METABOLIC PNL TOTAL CA: CPT | Performed by: STUDENT IN AN ORGANIZED HEALTH CARE EDUCATION/TRAINING PROGRAM

## 2023-11-14 RX ORDER — KETOROLAC TROMETHAMINE 10 MG/1
10 TABLET, FILM COATED ORAL EVERY 6 HOURS PRN
Qty: 20 TABLET | Refills: 0 | Status: SHIPPED | OUTPATIENT
Start: 2023-11-14 | End: 2023-11-17

## 2023-11-14 RX ORDER — PANTOPRAZOLE SODIUM 40 MG/1
40 TABLET, DELAYED RELEASE ORAL
Qty: 7 TABLET | Refills: 0 | Status: SHIPPED | OUTPATIENT
Start: 2023-11-14 | End: 2023-11-21

## 2023-11-14 RX ORDER — OXYCODONE HYDROCHLORIDE 5 MG/1
5 TABLET ORAL EVERY 6 HOURS PRN
Qty: 12 TABLET | Refills: 0 | Status: SHIPPED | OUTPATIENT
Start: 2023-11-14 | End: 2023-11-17

## 2023-11-14 RX ORDER — POLYETHYLENE GLYCOL 3350 17 G/17G
17 POWDER, FOR SOLUTION ORAL DAILY
Qty: 5 PACKET | Refills: 0 | Status: SHIPPED | OUTPATIENT
Start: 2023-11-15 | End: 2023-11-20

## 2023-11-14 RX ORDER — BISMUTH SUBSALICYLATE 262 MG
1 TABLET,CHEWABLE ORAL DAILY
COMMUNITY

## 2023-11-14 RX ADMIN — SODIUM CHLORIDE, POTASSIUM CHLORIDE, SODIUM LACTATE AND CALCIUM CHLORIDE 100 ML/HR: 600; 310; 30; 20 INJECTION, SOLUTION INTRAVENOUS at 05:22

## 2023-11-14 RX ADMIN — SERTRALINE 100 MG: 100 TABLET, FILM COATED ORAL at 08:37

## 2023-11-14 RX ADMIN — DIGOXIN 250 MCG: 250 TABLET ORAL at 08:37

## 2023-11-14 RX ADMIN — OXYCODONE HYDROCHLORIDE 5 MG: 5 TABLET ORAL at 03:34

## 2023-11-14 RX ADMIN — KETOROLAC TROMETHAMINE 15 MG: 15 INJECTION, SOLUTION INTRAMUSCULAR; INTRAVENOUS at 05:22

## 2023-11-14 RX ADMIN — KETOROLAC TROMETHAMINE 15 MG: 15 INJECTION, SOLUTION INTRAMUSCULAR; INTRAVENOUS at 15:48

## 2023-11-14 RX ADMIN — FOLIC ACID 1 MG: 1 TABLET ORAL at 08:37

## 2023-11-14 RX ADMIN — HEPARIN SODIUM 5000 UNITS: 5000 INJECTION, SOLUTION INTRAVENOUS; SUBCUTANEOUS at 05:22

## 2023-11-14 RX ADMIN — ACETAMINOPHEN 975 MG: 325 TABLET ORAL at 15:48

## 2023-11-14 RX ADMIN — ACETAMINOPHEN 975 MG: 325 TABLET ORAL at 05:25

## 2023-11-14 RX ADMIN — Medication 1 TABLET: at 08:37

## 2023-11-14 ASSESSMENT — LIFESTYLE VARIABLES
ORIENTATION AND CLOUDING OF SENSORIUM: ORIENTED AND CAN DO SERIAL ADDITIONS
TREMOR: NO TREMOR
AUDITORY DISTURBANCES: NOT PRESENT
TOTAL SCORE: 0
ANXIETY: NO ANXIETY, AT EASE
VISUAL DISTURBANCES: NOT PRESENT
TREMOR: NO TREMOR
ANXIETY: NO ANXIETY, AT EASE
VISUAL DISTURBANCES: NOT PRESENT
HEADACHE, FULLNESS IN HEAD: NOT PRESENT
AGITATION: NORMAL ACTIVITY
PAROXYSMAL SWEATS: NO SWEAT VISIBLE
TOTAL SCORE: 0
NAUSEA AND VOMITING: NO NAUSEA AND NO VOMITING
VISUAL DISTURBANCES: NOT PRESENT
HEADACHE, FULLNESS IN HEAD: NOT PRESENT
AUDITORY DISTURBANCES: NOT PRESENT
TOTAL SCORE: 0
AUDITORY DISTURBANCES: NOT PRESENT
NAUSEA AND VOMITING: NO NAUSEA AND NO VOMITING
ORIENTATION AND CLOUDING OF SENSORIUM: ORIENTED AND CAN DO SERIAL ADDITIONS
PAROXYSMAL SWEATS: NO SWEAT VISIBLE
PAROXYSMAL SWEATS: NO SWEAT VISIBLE
NAUSEA AND VOMITING: NO NAUSEA AND NO VOMITING
HEADACHE, FULLNESS IN HEAD: NOT PRESENT
ORIENTATION AND CLOUDING OF SENSORIUM: ORIENTED AND CAN DO SERIAL ADDITIONS
AGITATION: NORMAL ACTIVITY
ANXIETY: NO ANXIETY, AT EASE
AGITATION: NORMAL ACTIVITY
TREMOR: NO TREMOR

## 2023-11-14 ASSESSMENT — PAIN - FUNCTIONAL ASSESSMENT
PAIN_FUNCTIONAL_ASSESSMENT: 0-10

## 2023-11-14 ASSESSMENT — PAIN SCALES - GENERAL
PAINLEVEL_OUTOF10: 7
PAINLEVEL_OUTOF10: 5 - MODERATE PAIN
PAINLEVEL_OUTOF10: 2
PAINLEVEL_OUTOF10: 0 - NO PAIN

## 2023-11-14 ASSESSMENT — PAIN DESCRIPTION - DESCRIPTORS: DESCRIPTORS: ACHING;DISCOMFORT

## 2023-11-14 NOTE — ED NOTES
Pharmacy Medication History Review    Kostas Ruff is a 58 y.o. male admitted for Renal mass. Pharmacy reviewed the patient's ytnng-mv-ejlnolggd medications and allergies for accuracy.    The list below reflects the updated PTA list. Comments regarding how patient may be taking medications differently can be found in the Admit Orders Activity  Prior to Admission Medications   Prescriptions Last Dose Informant Patient Reported? Taking?   digoxin (Lanoxin) 250 MCG tab;et   Yes Yes   Sig: Take 1 tablet (250 mcg) by mouth once daily.   multivitamin tablet   Yes Yes   Sig: Take 1 tablet by mouth once daily.   naproxen (Naprosyn) 500 mg tablet   Yes No   Sig: Take 1 tablet (500 mg) by mouth 2 times a day as needed for mild pain (1 - 3) for up to 15 days.   sertraline (Zoloft) 100 mg tablet   Yes Yes    Sig: Take 1 tablet (100 mg) by mouth once daily.      Facility-Administered Medications: None        The list below reflects the updated allergy list. Please review each documented allergy for additional clarification and justification.  Allergies  Reviewed by Vane Bernstein RN on 11/13/2023   No Known Allergies         Patient declines M2B at discharge. Pharmacy has been updated to [id number].    Sources used to complete the med history include:  - Dispense Medication History  - Surescripts  - Physician Note (Bethany Vieira MD) 10/30/23  - Patient interview    Below are additional concerns with the patient's PTA list.  - Patient was a good historian. He was was able to recognize his medication names and frequency.     Elena Ortiz  PGY-1 Pharmacy Resident   [facility]  Please reach out via [employer] Secure Chat for questions, or if no response call b3 bio or Webflakes “MedRec”      
I will START or STAY ON the medications listed below when I get home from the hospital:  None

## 2023-11-14 NOTE — SIGNIFICANT EVENT
ESPs catheter removal    At 4:40 pm, 2 BOAZ catheters were removed by Ramsey Ramirez MD. Catheters removed without complications, tips intact, no signs of infection on insertion points. Patient tolerated removal of catheters appropriately.

## 2023-11-14 NOTE — SIGNIFICANT EVENT
"Urologic Surgery Postoperative Check    Kostas Ruff  [id number]       Assessment & Plan:  58 y.o. male who is POD 0 from open right radical nephrectomy, repair of pleural injury with  Dr. Arriaga  for  renal mass . Intra-op notable for serosal injury on colon with electrocautery which was repaired and pleural injury which was repaired.    Patient seen in PACU, awaiting bed availability on floor  Chest XR in PACU: New development of moderate right apical pneumothorax.     Neuro: Continue current pain regimen. Once on floor will receive Tylenol 975 scheduled q6, Toradol IV 15mg scheduled, and Oxycodone PRN for breakthrough. Bilateral pain catheters with acute pain. CIWA monitoring.   Folate supplement  Home sertraline 100mg ordered  Resp: continuous pulse ox. Aggressive IS, continue supplemental oxygen for management of pneumothorax. If worsening respiratory status, may require chest tube placement. Repeat Chest XR in AM  Card: monitor vitals. Home Digoxin 250mcg daily ordered  FEN: IVF @ 100 . BMP in AM  GI: CLD. Bowel regimen  : maintain kapadia  Heme/ID: periop ancef complete. CBC in AM.  Ppy: SQH 5000u tid  Dispo: Continue care on RNF.    SUBJECTIVE  Pain well controlled  Nausea well controlled, has not vomited  Denies passing gas, not having bowel movements  Voiding via kapadia catheter  Has not ambulated    OBJECTIVE  /72   Pulse 72   Temp 36.5 °C (97.7 °F) (Temporal)   Resp 12   Ht 1.727 m (5' 8\")   Wt 63.8 kg (140 lb 10.5 oz)   SpO2 99%   BMI 21.39 kg/m²   Physical Exam:  Gen: in no apparent distress  Resp: has a normal respiratory effort on 2L NC O2 satting 99%  Abd: Abdomen is soft, nontender, and nondistended.  Right flank incision is clean, dry and intact with glue.   Skin: Warm and dry    Darius Valerio MD   Urology PGY-2  Pager: [phone]    "

## 2023-11-14 NOTE — DISCHARGE SUMMARY
Discharge Diagnosis  Renal mass    Issues Requiring Follow-Up  Chest X-ray prior to visit on 5/17 with Kellie Blandon  Pathology results    Test Results Pending At Discharge  Pending Labs       Order Current Status    Surgical Pathology Exam In process            Hospital Course  Patient is a 58 y.o. male who presented with renal mass s/p right open radical nephrectomy that was complicated by right pneumothorax and colonic serosal injury on 11/13/2023. Patient had a uncomplicated hospital course. Multiple CXRs were done showing stabilization of pneumothorax and patient had stable respiratory status postoperatively. Patient was weaned off oxygen, tolerated advancement, and ambulated sufficiently. Patient was discharged home on 11/14/2023. Patient will have close follow up on 11/17 and will have another CXR scheduled just prior to post op visit.    Home Medications     Medication List      START taking these medications     ketorolac 10 mg tablet; Commonly known as: Toradol; Take 1 tablet (10   mg) by mouth every 6 hours if needed for moderate pain (4 - 6) for up to 3   days.   oxyCODONE 5 mg immediate release tablet; Commonly known as: Roxicodone;   Take 1 tablet (5 mg) by mouth every 6 hours if needed for severe pain (7 -   10) for up to 3 days.   pantoprazole 40 mg EC tablet; Commonly known as: ProtoNix; Take 1 tablet   (40 mg) by mouth once daily in the morning. Take before meals for 7 days.   Do not crush, chew, or split.   polyethylene glycol 17 gram packet; Commonly known as: Glycolax,   Miralax; Take 17 g by mouth once daily for 5 days. Do not start before   November 15, 2023.; Start taking on: November 15, 2023     CONTINUE taking these medications     digoxin 250 MCG tab;et; Commonly known as: Lanoxin; Take 1 tablet (250   mcg) by mouth once daily.   multivitamin tablet   sertraline 100 mg tablet; Commonly known as: Zoloft; Take 1 tablet (100   mg) by mouth once daily.     STOP taking these medications      naproxen 500 mg tablet; Commonly known as: Naprosyn       Outpatient Follow-Up  Future Appointments   Date Time Provider Department Center   12/21/2023 10:10 AM MD GABRIELA ArmstrongSUNDAR Carlos MD

## 2023-11-14 NOTE — PROGRESS NOTES
Acute Pain Service    Postop Pain HPI -   Palliative: relieved with IV analgesics and regional local anesthetics  Provocative: movement  Quality:  burning and aching  Radiation:  none  Severity:  1/10  Timing: constant    24-HOUR OPIOID CONSUMPTION:  5mg Oxycodone    Scheduled medications  acetaminophen, 975 mg, oral, q6h  digoxin, 250 mcg, oral, Daily  folic acid, 1 mg, oral, Daily  heparin (porcine), 5,000 Units, subcutaneous, q8h  ketorolac, 15 mg, intravenous, q6h  multivitamin with minerals, 1 tablet, oral, Daily  polyethylene glycol, 17 g, oral, Daily  sertraline, 100 mg, oral, Daily      Continuous medications  ropivacaine (PF) in NS cmpd, 6 mL/hr      PRN medications  PRN medications: ondansetron **OR** ondansetron, oxyCODONE     Physical Exam:  Constitutional:  no distress, alert and cooperative  Eyes: clear sclera  Head/Neck: No apparent injury, trachea midline  Respiratory/Thorax: Patent airways, thorax symmetric, breathing comfortably  Cardiovascular: no pitting edema  Gastrointestinal: Nondistended  Musculoskeletal: ROM intact  Extremities: no clubbing  Neurological: alert, putnam x4  Psychological: Appropriate affect    Plan:     - BL BOAZ blocks performed preoperatively on 11/13/23  - Ambit ball with Ropivacaine 0.2%/NaCl 0.9% 500mL, Rate 7 cc/hr bilaterally  - Ambit medication will not interfere with pain medication prescribed by the primary team.   - Please be aware of local anesthetic toxic dose and absorption variability before considering lidocaine patches  - Acute pain service will follow while catheters in place  - Rest of pain management per primary team  - Refill medication bag today     Acute Pain Resident  pg [phone] ph [phone]

## 2023-11-14 NOTE — PROGRESS NOTES
"Kostas Ruff is a 58 y.o. male on day 1 of admission presenting with Renal mass s/p open radical nephrectomy c/b right pneumothorax and colonic serosal injury.    Subjective   On room air, saturating 100%  No chest pain, no difficulty breathing  Tolerating clear liquid diet  Minimal pain  Would like to discharge today if possible        Objective     Physical Exam  Constitutional:       General: He is not in acute distress.  Pulmonary:      Effort: No respiratory distress.   Abdominal:      General: There is no distension.      Tenderness: There is abdominal tenderness.   Musculoskeletal:         General: No swelling.      Cervical back: Rigidity present.         Last Recorded Vitals  Blood pressure 154/73, pulse 67, temperature 36.9 °C (98.4 °F), temperature source Temporal, resp. rate 14, height 1.727 m (5' 8\"), weight 63.8 kg (140 lb 10.5 oz), SpO2 98 %.  Intake/Output last 3 Shifts:  I/O last 3 completed shifts:  In: 3220 (50.5 mL/kg) [P.O.:10; I.V.:3210 (50.3 mL/kg)]  Out: 1730 (27.1 mL/kg) [Urine:1630 (0.7 mL/kg/hr); Blood:100]  Weight: 63.8 kg     Relevant Results  Scheduled medications  acetaminophen, 975 mg, oral, q6h  digoxin, 250 mcg, oral, Daily  folic acid, 1 mg, oral, Daily  heparin (porcine), 5,000 Units, subcutaneous, q8h  ketorolac, 15 mg, intravenous, q6h  multivitamin with minerals, 1 tablet, oral, Daily  polyethylene glycol, 17 g, oral, Daily  sertraline, 100 mg, oral, Daily      Continuous medications  ropivacaine (PF) in NS cmpd, 6 mL/hr      XR chest 1 view    Result Date: 11/13/2023  Interpreted By:  OByrne, Benoit,  and [place] Sudha STUDY: XR CHEST 1 VIEW;  11/13/2023 11:58 am   INDICATION: Signs/Symptoms:diaphragm / pleura repair during open nephrectomy.   COMPARISON: Chest radiograph 11/10/2023   ACCESSION NUMBER(S): [id number]   ORDERING CLINICIAN: MELIDA OMALLEY   FINDINGS: Single AP semi-upright portable radiograph of the chest was provided.   CARDIOMEDIASTINAL SILHOUETTE: The " cardiomediastinal silhouette is stable in size and configuration.   LUNGS: Interval development of a moderate right apical pneumothorax. No focal consolidation. No left pneumothorax. No sizable effusion.   ABDOMEN: Small volume of air layering under the right hemidiaphragm.   BONES: No acute osseous abnormalities.       1. New development of moderate right apical pneumothorax. 2. Small volume of air layering under the right hemidiaphragm which may be secondary to recent surgical intervention.   I personally reviewed the image(s)/study and resident interpretation as stated by Dr. Sudha Barraza MD. I agree with the findings as stated. This study was interpreted at [facility], [place], [place].   MACRO: Critical Finding:  New right apical pneumothorax. Notification to Dr. Dolly Omalley was initiated on 11/13/2023 at 2:06 pm by Dr. Sudha Barraza.  (**-OCF-**)   Signed by: Benoit Alfred 11/13/2023 2:34 PM Dictation workstation:   [id number]    XR chest 2 views    Result Date: 11/11/2023  Interpreted By:  Tanisha Koehler, STUDY: Chest, 2 views.   INDICATION: Signs/Symptoms:renal mass.   COMPARISON: CT of the abdomen and pelvis 10/26/2023.   ACCESSION NUMBER(S): [id number]   ORDERING CLINICIAN: DOLLY OMALLEY   FINDINGS: The cardiomediastinal silhouette size is within normal limits. A questionable 6 mm nodular opacity noted projecting in the left lower lung zone. There is no focal consolidation, edema or pneumothorax. No sizeable pleural effusion.       1. No acute cardiopulmonary process. 2. A questionable 6 mm nodular opacity noted projecting in the left lower lung zone. Recommend CT chest for further evaluation.   MACRO: None.   Signed by: Tanisha Koehler 11/11/2023 9:17 AM Dictation workstation:   [id number]    CT abdomen pelvis w IV contrast    1. Mass from the right kidney, highly concerning in appearance for malignancy. 2. Findings concerning for subcapsular hematoma  involving the right kidney.   MACRO: Rico Linares discussed the significance and urgency of this critical finding by telephone with  KADIE HERNANDEZ on 10/26/2023 at 3:43 pm. (**-RCF-**) Findings:  See findings.     Signed by: Rico Linares 10/26/2023 3:44 PM Dictation workstation:   [id number]               Assessment/Plan   Principal Problem:    Renal mass      Diet: reg diet   Pain: PO pain control (Tylenol / ketorlac   Volume: hliv  OT/PT: PT/OT  Resp: encourage IS x10/day while awake - ambulate as tolerated, CXR today in afternoon to trend pneumothorax  Infection: monitor CBC / post operative anemia / no abx warranted  Tubes/drains: kapadia removed 11/14  Embolic ppx: SCD's / SQH / lovenox  Cardiac: continue to monitor  Glycemic:  no glycemic issues    Dispo: home today vs 11/15           Dolly Orozco MD

## 2023-11-14 NOTE — CARE PLAN
Problem: Skin  Goal: Decreased wound size/increased tissue granulation at next dressing change  Outcome: Adequate for Discharge  Goal: Participates in plan/prevention/treatment measures  Outcome: Adequate for Discharge  Goal: Prevent/manage excess moisture  Outcome: Adequate for Discharge  Goal: Prevent/minimize sheer/friction injuries  Outcome: Adequate for Discharge  Goal: Promote/optimize nutrition  Outcome: Adequate for Discharge  Goal: Promote skin healing  Outcome: Adequate for Discharge     Problem: Pain  Goal: Takes deep breaths with improved pain control throughout the shift  Outcome: Adequate for Discharge  Goal: Turns in bed with improved pain control throughout the shift  Outcome: Adequate for Discharge  Goal: Walks with improved pain control throughout the shift  Outcome: Adequate for Discharge  Goal: Performs ADL's with improved pain control throughout shift  Outcome: Adequate for Discharge  Goal: Participates in PT with improved pain control throughout the shift  Outcome: Adequate for Discharge  Goal: Free from opioid side effects throughout the shift  Outcome: Adequate for Discharge  Goal: Free from acute confusion related to pain meds throughout the shift  Outcome: Adequate for Discharge     Problem: Respiratory  Goal: Clear secretions with interventions this shift  Outcome: Adequate for Discharge  Goal: Minimize anxiety/maximize coping throughout shift  Outcome: Adequate for Discharge  Goal: Minimal/no exertional discomfort or dyspnea this shift  Outcome: Adequate for Discharge  Goal: No signs of respiratory distress (eg. Use of accessory muscles. Peds grunting)  Outcome: Adequate for Discharge  Goal: Patent airway maintained this shift  Outcome: Adequate for Discharge  Goal: Tolerate mechanical ventilation evidenced by VS/agitation level this shift  Outcome: Adequate for Discharge  Goal: Tolerate pulmonary toileting this shift  Outcome: Adequate for Discharge  Goal: Verbalize decreased shortness  of breath this shift  Outcome: Adequate for Discharge  Goal: Wean oxygen to maintain O2 saturation per order/standard this shift  Outcome: Adequate for Discharge  Goal: Increase self care and/or family involvement in next 24 hours  Outcome: Adequate for Discharge   The patient's goals for the shift include to have tolerable pain and get some rest    The clinical goals for the shift include patient will remain HDS, no s/sx of bleed, and deny any respiratory distress throughout shift

## 2023-11-14 NOTE — PROGRESS NOTES
PLAN: 58 y.o. male who is POD 0 from open right radical nephrectomy, repair of pleural injury with  Dr. Arriaga  for  renal mass . Intra-op notable for serosal injury on colon with electrocautery which was repaired and pleural injury which was repaired. Now POD 1, admitted to [facility] to Urology for ongoing assessments and symptom management, close observation, supplemental O2, aggressive IS, and tlc. Not medically clear for discharge.      PAYOR: [employer]     DISPO: Home with outpatient follow-up     SUPPORT/CONTACT: [place] [phone]  Spoke with patient to complete assessment. Patient lives with spouse at address on file, home has 1 stair to enter and no indwelling stairs. IPTA, active with PCP, verbalized prescription coverage, patient states he is on temp medical leave from work. Anticipate home with no needs/ outpatient follow-up when medically clear for discharge. Available to assist as needed.

## 2023-11-14 NOTE — HOSPITAL COURSE
Patient is a 58 y.o. male who presented with renal mass s/p right open radical nephrectomy that was complicated by right pneumothorax and colonic serosal injury on 11/13/2023. Patient had a uncomplicated hospital course. Multiple CXRs were done showing stabilization of pneumothorax and patient had stable respiratory status postoperatively. Patient was weaned off oxygen, tolerated advancement, and ambulated sufficiently. Patient was discharged home on 11/14/2023. Patient will have close follow up on 11/17 and will have another CXR scheduled just prior to post op visit.

## 2023-11-14 NOTE — DISCHARGE INSTRUCTIONS
DEPARTMENT OF Urology  DISCHARGE INSTRUCTIONS -- Nephrectomy  Inpatient Surgery    C O N F I D E N T I A L   I N F O R M A T I O N    Kostas Ruff      Call [phone] during regular daytime business hours (8:00 am - 5:00 pm) and after 5:00 pm ask for the Urology resident with any questions or concerns.    If it is a life-threatening situation, proceed to the nearest emergency department.        Follow-up appointment:  Friday 11/17/23 with Courtney Behmlander  - Please obtain Chest X Ray (CXR) on 11/16 or 11/17    Thank you for the opportunity to care for you today.  Your health and healing are very important to us.  We hope we made you feel as comfortable as possible and are committed to your recovery and continued well-being.      The following is a brief overview of your nephrectomy procedure. Some of the information contained on this summary may be confidential.  This information should be kept in your records and should be shared with your regular doctor.    Physicians:   Dr. Arriaga    Procedure performed: removal of all of your kidney  Pending Results:   - Chest X Ray  - Pathology results    What to Expect During your Recovery and Home Care  Anesthesia Side Effects   You may feel sleepy, tired, or have a sore throat.   You may also feel drowsiness, dizziness, or inability to think clearly.  For your safety, do not drive, drink alcoholic beverages, take any unprescribed medication or make any important decisions for 24 hours after anesthesia.  A responsible adult should be with you for 24 hours.        Activity and Recovery    No heavy lifting greater than 10 pounds for the next 4 weeks. No driving until mobility has returned to normal. Do not drive or operate heavy machinery while taking narcotic pain medications as these medications can alter perception, impair judgement, and slow reaction times.  Pain Control  Unfortunately, you may experience pain after your procedure. Adequate pain management can  include alternative measures to help ease your pain and that can include over the counter Tylenol/ibuprofen or oxycodone which can be taken as prescribed as needed for breakthrough pain. Do not take more than 4,000mg of Tylenol in a 24-hour period.      If your procedure was done robotically you may experience gas pains from the surgery. Getting up and moving around is the best way to relieve those pains. You can also take some over the counter gas-x(simethicone).    Nausea/Vomiting   Clear liquids are best tolerated at first. Start slow, advance your diet as tolerated to normal foods. Avoid spicy, greasy, heavy foods at first. Also, you may feel nauseous or like you need to vomit if you take any type of medication on an empty stomach.  Call your physician if you are unable to eat or drink, are not passing gas and have persistent vomiting.    Signs of Bleeding   You could have some blood in your urine off and on over the next several weeks. Your urine will be light pink to yellow. Minor bleeding or drainage may occur from the surgical sites; however, excessive or consistent bleeding should be reported to your surgeon. Excessive bleeding is defined as blood that is dripping from wound, soaking you bandages, and is ketchup colored, thick with possible blood clots.  Consistent is defined as bleeding that does not stop.      Treatment/wound care:   Keep area(s) clean and dry.   It is okay to shower 24 hours after time of surgery.    Do not scrub wound(s), pat dry.    Do not submerge wound(s) in standing water until seen for follow up appointment (no tub bathing, swimming, or hot tubs).    Clean with mild soap, gentle washing, pat dry, cover with bandage as needed.    Avoid waterproof bandages.  No oils or lotions on incisions.  Staples/sutures removed in our [id number] days after the date of surgery.  Do not remove the staples/sutures on your own.    Please visually inspect your wound(s) at least once daily.  If the  wound(s) are in a difficult to see location, please use a mirror or have someone else assist with visual inspection.    Signs of Infection  Signs of infection can include fever, chills, redness around surgical incisions, green/yellow drainage from incisions, burning sensation with passing of urine, or severe abdominal pain.  If you see any of these occur, please contact your doctor's office at [phone].  Any fever higher than 100.4, especially if associated with an ill feeling, abdominal pain, chills, or nausea should be reported to your surgeon.      Assist in bowel movements/urination  Take daily stool softener you were prescribed  Increase fiber in diet  Increase water (6 to 8 glasses)  Increase walking   Can try adding over the counter MiraLAX or in extreme cases milk of magnesia.  If you have tried these methods and you are not passing gas, your bladder still feels full and you cannot have a bowel movement, please go to your nearest Emergency room/contact your physician.    Additional Instructions:   Use a small pillow to put pressure on your belly. This can make you more comfortable when you cough, laugh or do other actions.

## 2023-11-16 ENCOUNTER — HOSPITAL ENCOUNTER (OUTPATIENT)
Dept: RADIOLOGY | Facility: HOSPITAL | Age: 58
Discharge: HOME | End: 2023-11-16
Payer: COMMERCIAL

## 2023-11-16 DIAGNOSIS — N28.89 RENAL MASS: ICD-10-CM

## 2023-11-16 PROCEDURE — 71045 X-RAY EXAM CHEST 1 VIEW: CPT | Performed by: STUDENT IN AN ORGANIZED HEALTH CARE EDUCATION/TRAINING PROGRAM

## 2023-11-16 PROCEDURE — 71045 X-RAY EXAM CHEST 1 VIEW: CPT

## 2023-11-17 ENCOUNTER — APPOINTMENT (OUTPATIENT)
Dept: UROLOGY | Facility: HOSPITAL | Age: 58
End: 2023-11-17
Payer: COMMERCIAL

## 2023-11-22 LAB
LABORATORY COMMENT REPORT: NORMAL
PATH REPORT.FINAL DX SPEC: NORMAL
PATH REPORT.GROSS SPEC: NORMAL
PATH REPORT.RELEVANT HX SPEC: NORMAL
PATH REPORT.TOTAL CANCER: NORMAL
PATHOLOGY SYNOPTIC REPORT: NORMAL

## 2023-11-22 ASSESSMENT — ENCOUNTER SYMPTOMS
ALLERGIC/IMMUNOLOGIC NEGATIVE: 1
NAUSEA: 0
DIFFICULTY URINATING: 0
EYES NEGATIVE: 1
SHORTNESS OF BREATH: 0
CHILLS: 0
ENDOCRINE NEGATIVE: 1
PSYCHIATRIC NEGATIVE: 1
COUGH: 0
FEVER: 0

## 2023-11-22 NOTE — PROGRESS NOTES
Subjective   Patient ID: Kostas Ruff is a 58 y.o. male.    HPI  Patient is here to establish for surgery follow. Hx of renal mass. S/P Right open nephrectomy on 11/13/23 with Dr. Arriaga.  CT prior to surgery showed right kidney mass and subcasular hematoma involving right kidney.  Chronic BPH sx are mild and stable. Denies urgency and frequency. Denies dysuria. Denies hematuria. Nocturia x1. No medication for LUT'S.   PSA 2021 1.02    Review of Systems   Constitutional:  Negative for chills and fever.   HENT: Negative.     Eyes: Negative.    Respiratory:  Negative for cough and shortness of breath.    Cardiovascular:  Negative for chest pain and leg swelling.   Gastrointestinal:  Negative for nausea.   Endocrine: Negative.    Genitourinary:  Negative for difficulty urinating.        Negative except for documented in HPI   Allergic/Immunologic: Negative.    Neurological:         Alert & oriented X 3   Hematological:         Denies blood thinners   Psychiatric/Behavioral: Negative.         Objective   Physical Exam  Vitals and nursing note reviewed.   Constitutional:       General: He is not in acute distress.     Appearance: Normal appearance.   Pulmonary:      Effort: Pulmonary effort is normal.   Abdominal:      Tenderness: There is no abdominal tenderness.   Genitourinary:     Comments: Kidneys non palpable bilaterally  Bladder non palpable or tender  Scrotum no mass, No hydrocele  Epididymis- No spermatocele. Non Tender.  Testicles: No mass. WNL  Urethra: No discharge  Penis within normal limits... No lesions. circumcised  Prostate - symmetric, no nodules. BENIGN  Seminal Vesicals: No mass.  Sphincter tone: normal  Neurological:      Mental Status: He is alert.         Assessment/Plan   Diagnoses and all orders for this visit:  Renal mass  Benign prostatic hyperplasia without lower urinary tract symptoms  Nocturia      All available PSA values reviewed, Options discussed. Questions answered.  New PSA  ordered   Diet changes for prostate health discussed and educational information given. Pros/Cons of prostate health supplements discussed.   Treatment options for LUTS reviewed  Discussed timed voiding. Discussed fluid and caffeine intake  Treatment options for ED reviewed.  Lifestyle change to help prevent UTIs discussed. Encouraged fluid intake.  CT reviewed  Path report reviewed. Tx Options discussed. Pros/cons of tx options reviewed. Questions answered  Has F/U With Dr Arriaga  Will plan surveilance imaging 6 months    F/U  6months  With BMP and CT abd/pelvis and Cxr

## 2023-11-27 ENCOUNTER — OFFICE VISIT (OUTPATIENT)
Dept: UROLOGY | Facility: CLINIC | Age: 58
End: 2023-11-27
Payer: COMMERCIAL

## 2023-11-27 VITALS — BODY MASS INDEX: 22.05 KG/M2 | WEIGHT: 145 LBS | RESPIRATION RATE: 16 BRPM

## 2023-11-27 DIAGNOSIS — N40.0 BENIGN PROSTATIC HYPERPLASIA WITHOUT LOWER URINARY TRACT SYMPTOMS: ICD-10-CM

## 2023-11-27 DIAGNOSIS — N28.89 RENAL MASS: ICD-10-CM

## 2023-11-27 DIAGNOSIS — R35.1 NOCTURIA: ICD-10-CM

## 2023-11-27 PROCEDURE — 4004F PT TOBACCO SCREEN RCVD TLK: CPT | Performed by: UROLOGY

## 2023-11-27 PROCEDURE — 99204 OFFICE O/P NEW MOD 45 MIN: CPT | Performed by: UROLOGY

## 2023-11-30 ENCOUNTER — TELEPHONE (OUTPATIENT)
Dept: UROLOGY | Facility: HOSPITAL | Age: 58
End: 2023-11-30
Payer: COMMERCIAL

## 2023-12-02 ENCOUNTER — LAB (OUTPATIENT)
Dept: LAB | Facility: LAB | Age: 58
End: 2023-12-02
Payer: COMMERCIAL

## 2023-12-02 DIAGNOSIS — I48.20 CHRONIC ATRIAL FIBRILLATION (MULTI): ICD-10-CM

## 2023-12-02 DIAGNOSIS — Z12.5 SCREENING PSA (PROSTATE SPECIFIC ANTIGEN): ICD-10-CM

## 2023-12-02 LAB
ALBUMIN SERPL BCP-MCNC: 4.1 G/DL (ref 3.4–5)
ALP SERPL-CCNC: 75 U/L (ref 33–120)
ALT SERPL W P-5'-P-CCNC: 12 U/L (ref 10–52)
ANION GAP SERPL CALC-SCNC: 10 MMOL/L (ref 10–20)
AST SERPL W P-5'-P-CCNC: 12 U/L (ref 9–39)
BASOPHILS # BLD AUTO: 0.05 X10*3/UL (ref 0–0.1)
BASOPHILS NFR BLD AUTO: 0.9 %
BILIRUB SERPL-MCNC: 0.5 MG/DL (ref 0–1.2)
BUN SERPL-MCNC: 18 MG/DL (ref 6–23)
CALCIUM SERPL-MCNC: 9.4 MG/DL (ref 8.6–10.3)
CHLORIDE SERPL-SCNC: 105 MMOL/L (ref 98–107)
CHOLEST SERPL-MCNC: 150 MG/DL (ref 0–199)
CHOLESTEROL/HDL RATIO: 2.2
CO2 SERPL-SCNC: 31 MMOL/L (ref 21–32)
CREAT SERPL-MCNC: 0.94 MG/DL (ref 0.5–1.3)
DIGOXIN SERPL-MCNC: 1.3 NG/ML (ref 0.8–?)
EOSINOPHIL # BLD AUTO: 0.19 X10*3/UL (ref 0–0.7)
EOSINOPHIL NFR BLD AUTO: 3.5 %
ERYTHROCYTE [DISTWIDTH] IN BLOOD BY AUTOMATED COUNT: 12.5 % (ref 11.5–14.5)
GFR SERPL CREATININE-BSD FRML MDRD: >90 ML/MIN/1.73M*2
GLUCOSE SERPL-MCNC: 104 MG/DL (ref 74–99)
HCT VFR BLD AUTO: 37.9 % (ref 41–52)
HDLC SERPL-MCNC: 68 MG/DL
HGB BLD-MCNC: 12.4 G/DL (ref 13.5–17.5)
IMM GRANULOCYTES # BLD AUTO: 0 X10*3/UL (ref 0–0.7)
IMM GRANULOCYTES NFR BLD AUTO: 0 % (ref 0–0.9)
LDLC SERPL CALC-MCNC: 72 MG/DL
LYMPHOCYTES # BLD AUTO: 1.69 X10*3/UL (ref 1.2–4.8)
LYMPHOCYTES NFR BLD AUTO: 30.7 %
MCH RBC QN AUTO: 32 PG (ref 26–34)
MCHC RBC AUTO-ENTMCNC: 32.7 G/DL (ref 32–36)
MCV RBC AUTO: 98 FL (ref 80–100)
MONOCYTES # BLD AUTO: 0.46 X10*3/UL (ref 0.1–1)
MONOCYTES NFR BLD AUTO: 8.4 %
NEUTROPHILS # BLD AUTO: 3.11 X10*3/UL (ref 1.2–7.7)
NEUTROPHILS NFR BLD AUTO: 56.5 %
NON HDL CHOLESTEROL: 82 MG/DL (ref 0–149)
NRBC BLD-RTO: 0 /100 WBCS (ref 0–0)
PLATELET # BLD AUTO: 339 X10*3/UL (ref 150–450)
POTASSIUM SERPL-SCNC: 4.7 MMOL/L (ref 3.5–5.3)
PROT SERPL-MCNC: 6.4 G/DL (ref 6.4–8.2)
PSA SERPL-MCNC: 1.8 NG/ML
RBC # BLD AUTO: 3.87 X10*6/UL (ref 4.5–5.9)
SODIUM SERPL-SCNC: 141 MMOL/L (ref 136–145)
TRIGL SERPL-MCNC: 51 MG/DL (ref 0–149)
TSH SERPL-ACNC: 1 MIU/L (ref 0.44–3.98)
VIT B12 SERPL-MCNC: 280 PG/ML (ref 211–911)
VLDL: 10 MG/DL (ref 0–40)
WBC # BLD AUTO: 5.5 X10*3/UL (ref 4.4–11.3)

## 2023-12-02 PROCEDURE — 85025 COMPLETE CBC W/AUTO DIFF WBC: CPT

## 2023-12-02 PROCEDURE — 36415 COLL VENOUS BLD VENIPUNCTURE: CPT

## 2023-12-02 PROCEDURE — 80061 LIPID PANEL: CPT

## 2023-12-02 PROCEDURE — 82607 VITAMIN B-12: CPT

## 2023-12-02 PROCEDURE — 84153 ASSAY OF PSA TOTAL: CPT

## 2023-12-02 PROCEDURE — 84443 ASSAY THYROID STIM HORMONE: CPT

## 2023-12-02 PROCEDURE — 80053 COMPREHEN METABOLIC PANEL: CPT

## 2023-12-02 PROCEDURE — 80162 ASSAY OF DIGOXIN TOTAL: CPT

## 2023-12-06 NOTE — DOCUMENTATION CLARIFICATION NOTE
"    PATIENT:               DANAE NELSON  ACCT #:                  [id number]  MRN:                       [id number]  :                       1965  ADMIT DATE:       2023 5:37 AM  DISCH DATE:        2023 5:11 PM  RESPONDING PROVIDER #:        [id number]          PROVIDER RESPONSE TEXT:    RENAL CELL CARCINOMA,    CDI QUERY TEXT:    [facility]_Path Results Complicated            Instruction:    Based on your assessment of the patient and the clinical information, please provide the requested documentation by clicking on the appropriate radio button and enter any additional information if prompted.    Question: Based on your assessment of the patient and the pathology findings, can the pathology findings be confirmed by providing the requested documentation to include if specimen is benign or malignant, primary or secondary and any metastatic sites.  Please include diagnosis of disease    When answering this query, please exercise your independent professional judgment. The fact that a question is being asked, does not imply that any particular answer is desired or expected.    The patient's clinical indicators include:  Clinical Information:    Surgical Pathology from 23  resulted as -    Copied from Path Report Results    Component  FINAL DIAGNOSIS  \"A.  RIGHT KIDNEY, RADICAL NEPHRECTOMY:  RENAL CELL CARCINOMA, PAPILLARY TYPE, ISUP NUCLEAR GRADE 3, PATHOLOGIC STAGE pT1a pNX\"    Clinical Indicators: Progress Note 23 by Dr. Dolly Orozco - Urology    \"Mass from the right kidney, highly concerning in appearance for malignancy.\"    Treatment: Right open radical nephrectomy, repair of pleural injury    Risk Factors:  Renal mass  Options provided:  -- RENAL CELL CARCINOMA, Please specify additional information below  -- Other - I will add my own diagnosis  -- Refer to Clinical Documentation Reviewer    Query created by: Ambar Ocasio on 2023 10:30 AM      Electronically signed by:  DOLLY OROZCO MD " 12/6/2023 10:12 AM

## 2023-12-19 DIAGNOSIS — R35.1 NOCTURIA: ICD-10-CM

## 2023-12-19 DIAGNOSIS — N28.89 RENAL MASS: ICD-10-CM

## 2023-12-19 NOTE — PROGRESS NOTES
"FUV    Last seen - 11/13/23 for surgery.     HISTORY OF PRESENT ILLNESS:   Kostas Ruff is a 58 y.o. male who is being seen today for post op exam of right open radical nephrectomy on 11/13/23.    PAST MEDICAL HISTORY:  Past Medical History:   Diagnosis Date    A-fib (CMS/HCC)     Acute upper respiratory infection, unspecified 03/17/2020    Acute URI    Anxiety     Encounter for screening for malignant neoplasm of colon 05/21/2020    Screening for colorectal cancer    Herpes zoster     Personal history of other infectious and parasitic diseases 08/06/2021    History of herpes zoster       PAST SURGICAL HISTORY:  Past Surgical History:   Procedure Laterality Date    NEPHRECTOMY Right     open radical nephrectomy, repair of pleural injury    OTHER SURGICAL HISTORY  03/09/2020    Cyst excision    OTHER SURGICAL HISTORY  03/09/2020    Back surgery    OTHER SURGICAL HISTORY  03/09/2020    Knee surgery        ALLERGIES:   No Known Allergies     MEDICATIONS:   Current Outpatient Medications   Medication Instructions    digoxin (LANOXIN) 250 mcg, oral, Daily    multivitamin tablet 1 tablet, oral, Daily    pantoprazole (PROTONIX) 40 mg, oral, Daily before breakfast, Do not crush, chew, or split.    sertraline (ZOLOFT) 100 mg, oral, Daily        PHYSICAL EXAM:  There were no vitals taken for this visit.  Constitutional: Patient appears well-developed and well-nourished. No distress.    Pulmonary/Chest: Effort normal. No respiratory distress.   Abdominal: Soft, ND NT  : WNL  Musculoskeletal: Normal range of motion.    Neurological: Alert and oriented to person, place, and time.  Psychiatric: Normal mood and affect. Behavior is normal. Thought content normal.      Labs:  No results found for: \"TESTOSTERONE\"  Lab Results   Component Value Date    PSA 1.80 12/02/2023     No components found for: \"CBC\"  Lab Results   Component Value Date    CREATININE 0.94 12/02/2023     No components found for: \"TESTOTMS\"  No results found " "for: \"TESTF\"    Imaging:  - Surgical Pathology on 11/13/23 demonstrated RCC. Papillary type, ISUP nuclear grade 3, pathological stage pT1a pNX  - Results reviewed with patient. Patient reassured.     Discussion:  Pt is doing well, no complaints. Appetite and bowel movements are returning to baseline. Energy is returning to baseline. Denies any dysuria, hematuria, bothersome urinary frequency, urgency, or flank pain. Incision is healing well, no evidence of infection or erythema. Still following with Dr. Chin. Recommends to follow up with Dr. Chin as scheduled.      Assessment:    No diagnosis found.    Kostas Ruff is a 58 y.o. male here for FUV     Plan:   1) Will plan for patient to follow up with Dr. Chin as scheduled.   2) All questions and concerns were addressed. Patient verbalizes understanding and has no other questions at this time.     Scribe Attestation  By signing my name below, ISaranya, Scribjudie   attest that this documentation has been prepared under the direction and in the presence of Rico Arriaga MD.    "

## 2023-12-21 ENCOUNTER — OFFICE VISIT (OUTPATIENT)
Dept: UROLOGY | Facility: HOSPITAL | Age: 58
End: 2023-12-21
Payer: COMMERCIAL

## 2023-12-21 DIAGNOSIS — C64.1 MALIGNANT NEOPLASM OF RIGHT KIDNEY (MULTI): Primary | ICD-10-CM

## 2023-12-21 PROCEDURE — 99024 POSTOP FOLLOW-UP VISIT: CPT | Performed by: UROLOGY

## 2023-12-21 PROCEDURE — 4004F PT TOBACCO SCREEN RCVD TLK: CPT | Performed by: UROLOGY

## 2023-12-21 NOTE — LETTER
Date: 2023  RE:  Kostas Ruff  :  1965      To Whom It May Concern:    Our patient, Kostas, has been under our care and now may return back to work without restrictions.    Their return to work date is:  2023    If you have questions concerning this patient's immediate care, please feel free to contact our office at [phone].    Sincerely,      Madalyn Mcclain RN  Supervising Provider: Dr. Rico Arriaga

## 2024-05-01 ENCOUNTER — HOSPITAL ENCOUNTER (OUTPATIENT)
Dept: RADIOLOGY | Facility: HOSPITAL | Age: 59
Discharge: HOME | End: 2024-05-01
Payer: COMMERCIAL

## 2024-05-01 ENCOUNTER — LAB (OUTPATIENT)
Dept: LAB | Facility: LAB | Age: 59
End: 2024-05-01
Payer: COMMERCIAL

## 2024-05-01 DIAGNOSIS — N28.89 RENAL MASS: ICD-10-CM

## 2024-05-01 DIAGNOSIS — R35.1 NOCTURIA: ICD-10-CM

## 2024-05-01 LAB
ANION GAP SERPL CALC-SCNC: 12 MMOL/L (ref 10–20)
BUN SERPL-MCNC: 22 MG/DL (ref 6–23)
CALCIUM SERPL-MCNC: 9.5 MG/DL (ref 8.6–10.3)
CHLORIDE SERPL-SCNC: 106 MMOL/L (ref 98–107)
CO2 SERPL-SCNC: 27 MMOL/L (ref 21–32)
CREAT SERPL-MCNC: 1.16 MG/DL (ref 0.5–1.3)
EGFRCR SERPLBLD CKD-EPI 2021: 73 ML/MIN/1.73M*2
GLUCOSE SERPL-MCNC: 90 MG/DL (ref 74–99)
POTASSIUM SERPL-SCNC: 4.3 MMOL/L (ref 3.5–5.3)
SODIUM SERPL-SCNC: 141 MMOL/L (ref 136–145)

## 2024-05-01 PROCEDURE — 74177 CT ABD & PELVIS W/CONTRAST: CPT

## 2024-05-01 PROCEDURE — A9698 NON-RAD CONTRAST MATERIALNOC: HCPCS | Performed by: UROLOGY

## 2024-05-01 PROCEDURE — 74177 CT ABD & PELVIS W/CONTRAST: CPT | Performed by: RADIOLOGY

## 2024-05-01 PROCEDURE — 36415 COLL VENOUS BLD VENIPUNCTURE: CPT

## 2024-05-01 PROCEDURE — 71046 X-RAY EXAM CHEST 2 VIEWS: CPT

## 2024-05-01 PROCEDURE — 80048 BASIC METABOLIC PNL TOTAL CA: CPT

## 2024-05-01 PROCEDURE — 2550000001 HC RX 255 CONTRASTS: Performed by: UROLOGY

## 2024-05-01 RX ADMIN — IOHEXOL 500 ML: 12 SOLUTION ORAL at 17:20

## 2024-05-01 RX ADMIN — IOHEXOL 72 ML: 350 INJECTION, SOLUTION INTRAVENOUS at 18:22

## 2024-06-10 ENCOUNTER — OFFICE VISIT (OUTPATIENT)
Dept: UROLOGY | Facility: CLINIC | Age: 59
End: 2024-06-10
Payer: COMMERCIAL

## 2024-06-10 VITALS — WEIGHT: 145 LBS | BODY MASS INDEX: 22.05 KG/M2 | RESPIRATION RATE: 16 BRPM

## 2024-06-10 DIAGNOSIS — R35.1 NOCTURIA: ICD-10-CM

## 2024-06-10 DIAGNOSIS — C64.1 MALIGNANT NEOPLASM OF RIGHT KIDNEY (MULTI): ICD-10-CM

## 2024-06-10 DIAGNOSIS — N40.0 BENIGN PROSTATIC HYPERPLASIA WITHOUT LOWER URINARY TRACT SYMPTOMS: ICD-10-CM

## 2024-06-10 PROCEDURE — 4004F PT TOBACCO SCREEN RCVD TLK: CPT | Performed by: UROLOGY

## 2024-06-10 PROCEDURE — 99213 OFFICE O/P EST LOW 20 MIN: CPT | Performed by: UROLOGY

## 2024-06-10 ASSESSMENT — ENCOUNTER SYMPTOMS
PSYCHIATRIC NEGATIVE: 1
FEVER: 0
COUGH: 0
NAUSEA: 0
SHORTNESS OF BREATH: 0
EYES NEGATIVE: 1
ALLERGIC/IMMUNOLOGIC NEGATIVE: 1
CHILLS: 0
DIFFICULTY URINATING: 0
ENDOCRINE NEGATIVE: 1

## 2024-06-10 NOTE — PROGRESS NOTES
Subjective   Patient ID: Kostas Ruff is a 58 y.o. male.    HPI  Hx of renal mass. S/P Right open nephrectomy on 11/13/23 with Dr. Arriaga.  Recent CT on 5/24 showed no reoccurrence.  Recent CR was 1.16 on 5/24.   Chronic BPH sx are mild and stable. Denies urgency and frequency. Denies dysuria. Denies hematuria. Nocturia x1. No medication for LUT'S. Most recent PSA was 1.80 on 12/23. Prior PSA was 1.02.         Review of Systems   Constitutional:  Negative for chills and fever.   HENT: Negative.     Eyes: Negative.    Respiratory:  Negative for cough and shortness of breath.    Cardiovascular:  Negative for chest pain and leg swelling.   Gastrointestinal:  Negative for nausea.   Endocrine: Negative.    Genitourinary:  Negative for difficulty urinating.        Negative except for documented in HPI   Allergic/Immunologic: Negative.    Neurological:         Alert & oriented X 3   Hematological:         Denies blood thinners   Psychiatric/Behavioral: Negative.         Objective   Physical Exam  Vitals and nursing note reviewed.   Pulmonary:      Effort: Pulmonary effort is normal.   Abdominal:      Palpations: Abdomen is soft.      Tenderness: There is no abdominal tenderness.   Genitourinary:     Comments: Kidneys non palpable bilaterally  Bladder non palpable or tender  Neurological:      Mental Status: He is alert.         Assessment/Plan   Diagnoses and all orders for this visit:  Nocturia  Benign prostatic hyperplasia without lower urinary tract symptoms  Malignant neoplasm of right kidney (Multi)    All available PSA values reviewed, Options discussed. Questions answered.   Diet changes for prostate health discussed and educational information given. Pros/Cons of prostate health supplements discussed.   Treatment options for LUTS reviewed  Discussed timed voiding. Discussed fluid and caffeine intake  Treatment options for ED reviewed.  Lifestyle change to help prevent UTIs discussed. Encouraged fluid intake.  CT  reviewed-Will repeat in 6 months  KUB ordered for Left renal stones  BMP reviewed    F/U  6 months with PSA and BMP and CT and KUB

## 2024-06-24 ENCOUNTER — APPOINTMENT (OUTPATIENT)
Dept: UROLOGY | Facility: CLINIC | Age: 59
End: 2024-06-24
Payer: COMMERCIAL

## 2024-07-02 ENCOUNTER — OFFICE VISIT (OUTPATIENT)
Dept: PRIMARY CARE | Facility: CLINIC | Age: 59
End: 2024-07-02
Payer: COMMERCIAL

## 2024-07-02 VITALS
HEART RATE: 71 BPM | WEIGHT: 148.7 LBS | SYSTOLIC BLOOD PRESSURE: 127 MMHG | BODY MASS INDEX: 22.54 KG/M2 | HEIGHT: 68 IN | OXYGEN SATURATION: 96 % | DIASTOLIC BLOOD PRESSURE: 70 MMHG

## 2024-07-02 DIAGNOSIS — M79.641 BILATERAL HAND PAIN: Primary | ICD-10-CM

## 2024-07-02 DIAGNOSIS — M79.642 BILATERAL HAND PAIN: Primary | ICD-10-CM

## 2024-07-02 PROCEDURE — 4004F PT TOBACCO SCREEN RCVD TLK: CPT | Performed by: FAMILY MEDICINE

## 2024-07-02 PROCEDURE — 99213 OFFICE O/P EST LOW 20 MIN: CPT | Performed by: FAMILY MEDICINE

## 2024-07-02 NOTE — PROGRESS NOTES
Subjective   Kostas Ruff is a 58 y.o. male who presents for No chief complaint on file..  Here c/o pain in his hands, some numbness/tingling as well, has been going on for years but worsening recently.  He stats that his thumb hurts constantly.  He takes tylenol/ibuprofen prn. Both hands bother him but the right is worst.              Objective   Visit Vitals  /70 (BP Location: Left arm, Patient Position: Sitting, BP Cuff Size: Adult)   Pulse 71      Physical Exam  Vitals reviewed.   Constitutional:       General: He is not in acute distress.  Cardiovascular:      Rate and Rhythm: Normal rate.   Pulmonary:      Effort: Pulmonary effort is normal. No respiratory distress.   Musculoskeletal:      Comments: Decreased ROM of both wrists/hands due to pain.   Skin:     General: Skin is warm and dry.   Neurological:      General: No focal deficit present.      Mental Status: He is alert. Mental status is at baseline.         Assessment/Plan   Problem List Items Addressed This Visit    None  Visit Diagnoses       Bilateral hand pain    -  Primary    Relevant Orders    XR hand left 3+ views    XR hand right 3+ views    XR wrist 3+ views bilateral    Referral to Orthopaedic Surgery               Bethany Vieira MD

## 2024-07-02 NOTE — PROGRESS NOTES
qSubjective   Patient ID: Kostas Ruff is a 58 y.o. male who presents for pain in hands. Patient states the right hand hurts the worse. He feels it might be carpel tunnel. Patient also states it goes numb on him. Its gone on quite a few years but has been worsening.       HPI     Review of Systems    Objective   There were no vitals taken for this visit.    Physical Exam    Assessment/Plan

## 2024-07-08 ENCOUNTER — HOSPITAL ENCOUNTER (OUTPATIENT)
Dept: RADIOLOGY | Facility: HOSPITAL | Age: 59
Discharge: HOME | End: 2024-07-08
Payer: COMMERCIAL

## 2024-07-08 DIAGNOSIS — M79.641 BILATERAL HAND PAIN: ICD-10-CM

## 2024-07-08 DIAGNOSIS — M79.642 BILATERAL HAND PAIN: ICD-10-CM

## 2024-07-08 PROCEDURE — 73130 X-RAY EXAM OF HAND: CPT | Mod: LEFT SIDE | Performed by: RADIOLOGY

## 2024-07-08 PROCEDURE — 73110 X-RAY EXAM OF WRIST: CPT | Mod: 50

## 2024-07-08 PROCEDURE — 73130 X-RAY EXAM OF HAND: CPT | Mod: RT

## 2024-07-08 PROCEDURE — 73110 X-RAY EXAM OF WRIST: CPT | Mod: LEFT SIDE | Performed by: RADIOLOGY

## 2024-07-08 PROCEDURE — 73130 X-RAY EXAM OF HAND: CPT | Mod: LT

## 2024-07-12 ENCOUNTER — APPOINTMENT (OUTPATIENT)
Dept: ORTHOPEDIC SURGERY | Facility: CLINIC | Age: 59
End: 2024-07-12
Payer: COMMERCIAL

## 2024-07-12 DIAGNOSIS — M79.642 BILATERAL HAND PAIN: ICD-10-CM

## 2024-07-12 DIAGNOSIS — G56.12 MEDIAN NERVE DYSFUNCTION, LEFT: ICD-10-CM

## 2024-07-12 DIAGNOSIS — M79.641 BILATERAL HAND PAIN: ICD-10-CM

## 2024-07-12 DIAGNOSIS — G56.11 MEDIAN NERVE DYSFUNCTION, RIGHT: Primary | ICD-10-CM

## 2024-07-12 DIAGNOSIS — M18.11 ARTHRITIS OF CARPOMETACARPAL (CMC) JOINT OF RIGHT THUMB: ICD-10-CM

## 2024-07-12 PROCEDURE — L3809 WHFO W/O JOINTS PRE OTS: HCPCS | Performed by: NURSE PRACTITIONER

## 2024-07-12 PROCEDURE — 99204 OFFICE O/P NEW MOD 45 MIN: CPT | Performed by: NURSE PRACTITIONER

## 2024-07-12 PROCEDURE — L3908 WHO COCK-UP NONMOLDE PRE OTS: HCPCS | Performed by: NURSE PRACTITIONER

## 2024-07-12 RX ORDER — DICLOFENAC SODIUM 10 MG/G
2 GEL TOPICAL 4 TIMES DAILY PRN
Qty: 100 G | Refills: 1 | Status: SHIPPED | OUTPATIENT
Start: 2024-07-12

## 2024-07-12 RX ORDER — ASPIRIN 81 MG/1
81 TABLET ORAL DAILY
COMMUNITY

## 2024-07-12 RX ORDER — MELOXICAM 7.5 MG/1
7.5 TABLET ORAL DAILY
Qty: 30 TABLET | Refills: 1 | Status: SHIPPED | OUTPATIENT
Start: 2024-07-12 | End: 2024-09-10

## 2024-07-12 ASSESSMENT — PAIN SCALES - GENERAL
PAINLEVEL_OUTOF10: 3
PAINLEVEL_OUTOF10: 8

## 2024-07-12 ASSESSMENT — ENCOUNTER SYMPTOMS
NEUROLOGICAL NEGATIVE: 1
ENDOCRINE NEGATIVE: 1
CONSTITUTIONAL NEGATIVE: 1
RESPIRATORY NEGATIVE: 1
PSYCHIATRIC NEGATIVE: 1
ARTHRALGIAS: 1
HEMATOLOGIC/LYMPHATIC NEGATIVE: 1
CARDIOVASCULAR NEGATIVE: 1

## 2024-07-12 ASSESSMENT — PAIN - FUNCTIONAL ASSESSMENT: PAIN_FUNCTIONAL_ASSESSMENT: 0-10

## 2024-07-12 ASSESSMENT — PAIN DESCRIPTION - DESCRIPTORS
DESCRIPTORS: NUMBNESS
DESCRIPTORS: SHARP;ACHING

## 2024-07-12 NOTE — PROGRESS NOTES
"Subjective    Patient ID: Kostas Ruff is a 58 y.o. male.    Chief Complaint: Pain and New Patient Visit of the Left Hand (Patient reports his hand \"goes to sleep, 2-3 times a week mostly at night\"  this has been going on for several years.) and Pain and New Patient Visit of the Right Hand (Patient reports his hand \"goes to sleep, 2-3 times a week mostly at night\"  this has been going on for several years. His thumb is painful.)       AL Bonner is a pleasant 58-year-old gentleman presenting today for new patient evaluation of bilateral hand  Bilat hand and wrist pain for yrs, R worse than L and progressively worse over last month  L intermittent numbess/tingling  R pain, numbness and tingling  + nighttime awakening  No injury  RH dominant  Tylenol, Aleve and Ibuprofen attempted- minimal improvement  No brace or wraps, had carpal tunnel braces 7-8 yrs ago  Wakes at night  Works FT as     Review of Systems   Constitutional: Negative.    HENT: Negative.     Respiratory: Negative.     Cardiovascular: Negative.    Endocrine: Negative.    Musculoskeletal:  Positive for arthralgias.   Skin: Negative.    Neurological: Negative.    Hematological: Negative.    Psychiatric/Behavioral: Negative.            Objective   Right Hand Exam     Tenderness   The patient is experiencing tenderness in the dorsal area (CMC thumb joint, carpal tunnel).    Range of Motion   Wrist   Extension:  40   Flexion:  60     Tests   Phalen’s sign: positive  Tinel's sign (median nerve): positive  Finkelstein's test: negative    Other   Erythema: absent  Sensation: normal  Pulse: present    Comments:  Mild thenar swelling present.  Symptoms aggravated with palpation, range of motion and grind test of the CMC joint of the thumb.  Mildly limited range of motion of the wrist with positive palpable crepitus on exam.  Positive symptom aggravation over the carpal tunnel and with testing.  Full ROM of distal joints with no sx aggravation " distal motor and sensory intact but slightly blunted to tip of thumb and index finger, cap refill at 2 seconds.      Left Hand Exam     Tenderness   Left hand tenderness location: Minimal CMC thumb joint, minimal carpal tunnel.     Range of Motion   The patient has normal left wrist ROM.  Wrist   Extension:  normal   Flexion:  normal   Pronation:  normal   Supination:  normal     Muscle Strength   The patient has normal left wrist strength.    Tests   Phalen’s sign: positive  Tinel's sign (median nerve): negative  Finkelstein's test: negative    Other   Erythema: absent  Sensation: normal  Pulse: present    Comments:  Good range of motion of wrist, hand and all finger joints.  Distal motor and sensory intact, cap refill at 2 seconds.  Mild symptom aggravation with Phalen's testing        Image Results:  XR wrist 3+ views bilateral, XR hand right 3+ views, XR hand left 3+ views  Narrative: Interpreted By:  Tanisha Koehler,   STUDY:  Left hand, 3 views.  Right hand, three views.  Bilateral wrists, three views each.      INDICATION:  Signs/Symptoms:pain.      COMPARISON:  None.      ACCESSION NUMBER(S):  [id number]; [id number]; [id number]      ORDERING CLINICIAN:  JARRELL FRANCO      FINDINGS:  Left hand and left wrist:  No acute fracture or malalignment. Chronic heterotopic ossification  adjacent to the ulnar styloid noted. Mild 1st CMC joint degenerative  changes with joint space loss and osteophytes. TFCC  chondrocalcinosis. Small punctate metallic retained radiopaque  foreign bodies in the posterior soft tissues of the 2nd middle  phalanx.      Right hand and right wrist:  No acute fracture or malalignment.  Moderate 1st CMC joint degenerative changes with joint space loss.  Soft tissues are within normal limits.      Impression: 1. Moderate right 1st CMC joint osteoarthrosis.  2. Mild left 1st CMC joint osteoarthrosis.  3. Small punctate metallic retained radiopaque foreign bodies in the  posterior  soft tissues of the left 2nd middle phalanx. Correlate with  patient's occupational history.      MACRO:  None.      Signed by: Tanisha Koehler 7/9/2024 7:21 PM  Dictation workstation:   [id number]    Independent review of bilateral hand x-rays reviewed at today's visit.  Positive degenerative changes as noted on radiology report, moderate to right thumb CMC joint and mild to left thumb CMC joint.  No acute fracture or misalignment noted    Assessment/Plan   Encounter Diagnoses:  Problem List Items Addressed This Visit             ICD-10-CM    Median nerve dysfunction, right - Primary G56.11     Sx control with meloxicam 1 to 2 tablets daily with food, Tylenol prn.   Recommend Voltaren gel 4 times daily  Nighttime bracing and prn for repetitive or aggravating activities.   Home exercises for carpal tunnel provided  EMG ordered   Plan for follow-up in Ortho after EMG complete for result review and treatment plan  Patient in agreement with plan of care  This note was generated using Dragon software.  It may contain errors in wording, punctuation or spelling.         Relevant Orders    Follow Up In Orthopaedic Surgery    Arthritis of carpometacarpal (CMC) joint of right thumb M18.11    Relevant Orders    Follow Up In Orthopaedic Surgery    Median nerve dysfunction, left G56.12    Relevant Orders    Follow Up In Orthopaedic Surgery     Other Visit Diagnoses         Codes    Bilateral hand pain     M79.641, M79.642    Relevant Medications    diclofenac sodium (Voltaren) 1 % gel    meloxicam (Mobic) 7.5 mg tablet    Other Relevant Orders    EMG & nerve conduction

## 2024-07-16 NOTE — ASSESSMENT & PLAN NOTE
Sx control with meloxicam 1 to 2 tablets daily with food, Tylenol prn.   Recommend Voltaren gel 4 times daily  Nighttime bracing and prn for repetitive or aggravating activities.   Home exercises for carpal tunnel provided  EMG ordered   Plan for follow-up in Ortho after EMG complete for result review and treatment plan  Patient in agreement with plan of care  This note was generated using Dragon software.  It may contain errors in wording, punctuation or spelling.

## 2024-12-12 ENCOUNTER — APPOINTMENT (OUTPATIENT)
Dept: UROLOGY | Facility: CLINIC | Age: 59
End: 2024-12-12
Payer: COMMERCIAL

## 2025-02-03 ENCOUNTER — OFFICE VISIT (OUTPATIENT)
Age: 60
End: 2025-02-03
Payer: COMMERCIAL

## 2025-02-03 VITALS
OXYGEN SATURATION: 97 % | SYSTOLIC BLOOD PRESSURE: 138 MMHG | WEIGHT: 144 LBS | BODY MASS INDEX: 21.9 KG/M2 | HEART RATE: 108 BPM | DIASTOLIC BLOOD PRESSURE: 60 MMHG

## 2025-02-03 DIAGNOSIS — H61.23 BILATERAL IMPACTED CERUMEN: ICD-10-CM

## 2025-02-03 DIAGNOSIS — H66.002 NON-RECURRENT ACUTE SUPPURATIVE OTITIS MEDIA OF LEFT EAR WITHOUT SPONTANEOUS RUPTURE OF TYMPANIC MEMBRANE: Primary | ICD-10-CM

## 2025-02-03 PROCEDURE — 99213 OFFICE O/P EST LOW 20 MIN: CPT | Performed by: FAMILY MEDICINE

## 2025-02-03 RX ORDER — AMOXICILLIN 875 MG/1
875 TABLET, FILM COATED ORAL 2 TIMES DAILY
Qty: 20 TABLET | Refills: 0 | Status: SHIPPED | OUTPATIENT
Start: 2025-02-03 | End: 2025-02-13

## 2025-02-03 NOTE — PROGRESS NOTES
Subjective   Patient ID: Kostas Ruff is a 59 y.o. male who presents for Ear Fullness (left).    HPI     Review of Systems    Objective   There were no vitals taken for this visit.    Physical Exam    Assessment/Plan

## 2025-02-03 NOTE — PROGRESS NOTES
Subjective   Kostas Ruff is a 59 y.o. male who presents for Ear Fullness (left).  Here c/o left ear fullness.  No pain, just feels full.  Just started a couple of days ago.              Objective   Visit Vitals  /60   Pulse 108      Physical Exam  Vitals reviewed.   Constitutional:       General: He is not in acute distress.  HENT:      Head: Normocephalic and atraumatic.      Ears:      Comments: Tms occluded by wax bilaterally.  The left ear was irrigated by MA - there is some mild bleeding and the visible TM is red and irritated looking.   Cardiovascular:      Rate and Rhythm: Normal rate and regular rhythm.      Heart sounds: No murmur heard.  Pulmonary:      Effort: Pulmonary effort is normal. No respiratory distress.      Breath sounds: Normal breath sounds.   Skin:     General: Skin is warm and dry.   Neurological:      General: No focal deficit present.      Mental Status: He is alert. Mental status is at baseline.         Assessment/Plan   Problem List Items Addressed This Visit    None  Visit Diagnoses       Non-recurrent acute suppurative otitis media of left ear without spontaneous rupture of tympanic membrane    -  Primary    Relevant Medications    amoxicillin (Amoxil) 875 mg tablet    Bilateral impacted cerumen        Relevant Medications    carbamide peroxide (Debrox) 6.5 % otic solution               Bethany Vieira MD

## 2025-02-11 ENCOUNTER — APPOINTMENT (OUTPATIENT)
Age: 60
End: 2025-02-11
Payer: COMMERCIAL

## 2025-03-03 DIAGNOSIS — I48.20 CHRONIC ATRIAL FIBRILLATION (MULTI): ICD-10-CM

## 2025-03-03 DIAGNOSIS — F41.1 GENERALIZED ANXIETY DISORDER: ICD-10-CM

## 2025-03-03 RX ORDER — SERTRALINE HYDROCHLORIDE 100 MG/1
100 TABLET, FILM COATED ORAL DAILY
Qty: 90 TABLET | Refills: 3 | Status: SHIPPED | OUTPATIENT
Start: 2025-03-03 | End: 2026-02-26

## 2025-03-03 RX ORDER — DIGOXIN 250 MCG
250 TABLET ORAL DAILY
Qty: 90 TABLET | Refills: 3 | Status: SHIPPED | OUTPATIENT
Start: 2025-03-03 | End: 2026-03-03

## (undated) DEVICE — SUTURE, VICRYL, 2-0, 54 IN, TIE, VIOLET

## (undated) DEVICE — DRAPE, INCISE, ANTIMICROBIAL, IOBAN 2, LARGE, 17 X 23 IN, DISPOSABLE, STERILE

## (undated) DEVICE — CATHETER, URETHRAL, ROBNEL, 14 FR, 16 IN, LF, RED

## (undated) DEVICE — SUTURE, SILK, 2-0, FSL, BR, 30 IN, BLACK

## (undated) DEVICE — SUTURE, SILK, 0, 24 IN, SUTUPAK, BLACK

## (undated) DEVICE — LOOP, VESSEL, MINI, WHITE

## (undated) DEVICE — SUTURE, SILK, 3-0, 30 IN, MULTIPACK, BLACK

## (undated) DEVICE — APPLICATOR, CHLORAPREP, W/ORANGE TINT, 26ML

## (undated) DEVICE — SUTURE, VICRYL, 2-0, 27 IN, CT-1, VIOLET

## (undated) DEVICE — SUTURE, PROLENE, 4-0 VB/RB-1, 36IN, BLUE

## (undated) DEVICE — SPONGE, LAP, XRAY DECT, 18IN X 18IN, W/MASTER DMT, STERILE

## (undated) DEVICE — CLIP, LIGATING, HORIZON, MEDIUM, TITANIUM

## (undated) DEVICE — SUTURE, VICRYL, 2-0, 36 IN, CT-1, UNDYED

## (undated) DEVICE — COVER, TABLE, 44 X 75 IN, DISPOSABLE, LF, STERILE

## (undated) DEVICE — PROTECTOR, NERVE, ULNAR, PINK

## (undated) DEVICE — PAD, GROUNDING, ELECTROSURGICAL, DUAL

## (undated) DEVICE — SPONGE, HEMOSTATIC, CELLULOSE, SURGICEL, FIBRILLAR, 2 X 4 IN

## (undated) DEVICE — Device

## (undated) DEVICE — TOWEL, OR, XRAY DETECT 5 PK, WHITE, 17X26, W/DMT TAG, ST

## (undated) DEVICE — SUTURE, VICRYL, 3-0, 27 IN, SH, VIOLET

## (undated) DEVICE — SUTURE, VICRYL, 0, 54 IN, CTD, UNDYED

## (undated) DEVICE — SUTURE, PDS II, 1, 36 IN, CT-1, VIOLET

## (undated) DEVICE — GLOVE, SURGICAL, PROTEXIS,  7.5, PF, LATEX

## (undated) DEVICE — SPONGE, DISSECTOR, PEANUT, 3/8, STERILE 5 FOAM HOLDER"

## (undated) DEVICE — TOWEL, SURGICAL, NEURO, O/R, 16 X 26, BLUE, STERILE

## (undated) DEVICE — STAPLER, SKIN PROXIMATE, 35 WIDE

## (undated) DEVICE — SUTURE, PROLENE, 2-0, 30 IN, SH, BLUE

## (undated) DEVICE — TIP, SUCTION, YANKAUER, FLEXIBLE

## (undated) DEVICE — DRAPE, PAD, INSTRUMENT, MAGNETIC, MEDIUM, 10 X 16 IN, DISPOSABLE

## (undated) DEVICE — MANIFOLD, 4 PORT NEPTUNE STANDARD

## (undated) DEVICE — SUTURE, SILK, 2-0, TIES, 12-30 IN, BLACK

## (undated) DEVICE — CLIPPER, SURGICAL BLADE ASSEMBLY, GENERAL PURPOSE, SINGLE USE

## (undated) DEVICE — HANDPIECE, ABC, SINGLE FUNCTION, MALLEABLE, W/CORD, BEND-A-BEAM, 3 IN, LF

## (undated) DEVICE — COVER, CART, 45 X 27 X 48 IN, CLEAR

## (undated) DEVICE — CATHETER TRAY, SURESTEP, 16FR, URINE METER W/STATLOCK

## (undated) DEVICE — CLIP, LIGATING, HORIZON, LARGE, TITANIUM

## (undated) DEVICE — SPONGE, GAUZE, XRAY DECT, 16 PLY, 4 X 4, W/MASTER DMT,STERILE

## (undated) DEVICE — PILLOW, POLYFILL, NYLEX, 21 X 27 IN

## (undated) DEVICE — DRESSING, ADHESIVE, ISLAND, TELFA, 4 X 10 IN

## (undated) DEVICE — ELECTRODE, ELECTROSURGICAL, BLADE, EXTENDED, 6.5 IN, STAINLESS STEEL